# Patient Record
Sex: FEMALE | Race: WHITE | Employment: FULL TIME | URBAN - METROPOLITAN AREA
[De-identification: names, ages, dates, MRNs, and addresses within clinical notes are randomized per-mention and may not be internally consistent; named-entity substitution may affect disease eponyms.]

---
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
## 2021-05-21 ENCOUNTER — OFFICE VISIT (OUTPATIENT)
Dept: FAMILY MEDICINE CLINIC | Age: 21
End: 2021-05-21
Payer: MEDICAID

## 2021-05-21 VITALS
HEIGHT: 65 IN | WEIGHT: 235 LBS | TEMPERATURE: 98.1 F | DIASTOLIC BLOOD PRESSURE: 85 MMHG | HEART RATE: 74 BPM | BODY MASS INDEX: 39.15 KG/M2 | RESPIRATION RATE: 16 BRPM | SYSTOLIC BLOOD PRESSURE: 118 MMHG | OXYGEN SATURATION: 98 %

## 2021-05-21 DIAGNOSIS — Z00.00 ROUTINE GENERAL MEDICAL EXAMINATION AT A HEALTH CARE FACILITY: Primary | ICD-10-CM

## 2021-05-21 DIAGNOSIS — E55.9 VITAMIN D DEFICIENCY: ICD-10-CM

## 2021-05-21 DIAGNOSIS — E66.09 CLASS 2 OBESITY DUE TO EXCESS CALORIES WITHOUT SERIOUS COMORBIDITY WITH BODY MASS INDEX (BMI) OF 39.0 TO 39.9 IN ADULT: ICD-10-CM

## 2021-05-21 DIAGNOSIS — N94.6 DYSMENORRHEA: ICD-10-CM

## 2021-05-21 PROCEDURE — 99395 PREV VISIT EST AGE 18-39: CPT | Performed by: NURSE PRACTITIONER

## 2021-05-21 RX ORDER — NORETHINDRONE ACETATE AND ETHINYL ESTRADIOL 1MG-20(21)
KIT ORAL
Qty: 1 PACKAGE | Refills: 3 | Status: SHIPPED | OUTPATIENT
Start: 2021-05-21 | End: 2021-07-08

## 2021-05-21 NOTE — PATIENT INSTRUCTIONS
Learning About Being Physically Active What is physical activity? Being physically active means doing any kind of activity that gets your body moving. The types of physical activity that can help you get fit and stay healthy include: · Aerobic or \"cardio\" activities. These make your heart beat faster and make you breathe harder, such as brisk walking, riding a bike, or running. They strengthen your heart and lungs and build up your endurance. · Strength training activities. These make your muscles work against, or \"resist,\" something. Examples include lifting weights or doing push-ups. These activities help tone and strengthen your muscles and bones. · Stretches. These let you move your joints and muscles through their full range of motion. Stretching helps you be more flexible. What are the benefits of being active? Being active is one of the best things you can do for your health. It helps you to: · Feel stronger and have more energy to do all the things you like to do. · Focus better at school or work. · Feel, think, and sleep better. · Reach and stay at a healthy weight. · Lose fat and build lean muscle. · Lower your risk for serious health problems, including diabetes, heart attack, high blood pressure, and some cancers. · Keep your heart, lungs, bones, muscles, and joints strong and healthy. How can you make being active part of your life? Start slowly. Make it your long-term goal to get at least 30 minutes of exercise on most days of the week. Walking is a good choice. You also may want to do other activities, such as running, swimming, cycling, or playing tennis or team sports. Pick activities that you likeones that make your heart beat faster, your muscles stronger, and your muscles and joints more flexible. If you find more than one thing you like doing, do them all. You don't have to do the same thing every day. Get your heart pumping every day.  Any activity that makes your heart beat faster and keeps it at that rate for a while counts. Here are some great ways to get your heart beating faster: · Go for a brisk walk, run, or bike ride. · Go for a hike or swim. · Go in-line skating. · Play a game of touch football, basketball, or soccer. · Ride a bike. · Play tennis or racquetball. · Climb stairs. Even some household chores can be aerobicjust do them at a faster pace. Vacuuming, raking or mowing the lawn, sweeping the garage, and washing and waxing the car all can help get your heart rate up. Strengthen your muscles during the week. You don't have to lift heavy weights or grow big, bulky muscles to get stronger. Doing a few simple activities that make your muscles work against, or \"resist,\" something can help you get stronger. For example, you can: · Do push-ups or sit-ups, which use your own body weight as resistance. · Lift weights or dumbbells or use stretch bands at home or in a gym or community center. Stretch your muscles often. Stretching will help you as you become more active. It can help you stay flexible, loosen tight muscles, and avoid injury. It can also help improve your balance and posture and can be a great way to relax. Be sure to stretch the muscles you'll be using when you work out. It's best to warm your muscles slightly before you stretch them. Walk or do some other light aerobic activity for a few minutes, and then start stretching. When you stretch your muscles: · Do it slowly. Stretching is not about going fast or making sudden movements. · Don't push or bounce during a stretch. · Hold each stretch for at least 15 to 30 seconds, if you can. You should feel a stretch in the muscle, but not pain. · Breathe out as you do the stretch. Then breathe in as you hold the stretch. Don't hold your breath. If you're worried about how more activity might affect your health, have a checkup before you start.  Follow any special advice your doctor gives you for getting a smart start. Where can you learn more? Go to http://www.gray.com/ Enter S201 in the search box to learn more about \"Learning About Being Physically Active. \" Current as of: September 10, 2020               Content Version: 12.8 © 2006-2021 [employer]. Care instructions adapted under license by [employer] (which disclaims liability or warranty for this information). If you have questions about a medical condition or this instruction, always ask your healthcare professional. [place] [address] any warranty or liability for your use of this information. Eating Healthy Foods: Care Instructions Your Care Instructions Eating healthy foods can help lower your risk for disease. Healthy food gives you energy and keeps your heart strong, your brain active, your muscles working, and your bones strong. A healthy diet includes a variety of foods from the basic food groups: grains, vegetables, fruits, milk and milk products, and meat and beans. Some people may eat more of their favorite foods from only one food group and, as a result, miss getting the nutrients they need. So, it is important to pay attention not only to what you eat but also to what you are missing from your diet. You can eat a healthy, balanced diet by making a few small changes. Follow-up care is a key part of your treatment and safety. Be sure to make and go to all appointments, and call your doctor if you are having problems. It's also a good idea to know your test results and keep a list of the medicines you take. How can you care for yourself at home? Look at what you eat · Keep a food diary for a week or two and record everything you eat or drink. Track the number of servings you eat from each food group.  
· For a balanced diet every day, eat a variety of: 
? 6 or more ounce-equivalents of grains, such as cereals, breads, crackers, rice, or pasta, every day. An ounce-equivalent is 1 slice of bread, 1 cup of ready-to-eat cereal, or ½ cup of cooked rice, cooked pasta, or cooked cereal. 
? 2½ cups of vegetables, especially: § Dark-green vegetables such as broccoli and spinach. § Orange vegetables such as carrots and sweet potatoes. § Dry beans (such as downs and kidney beans) and peas (such as lentils). ? 2 cups of fresh, frozen, or canned fruit. A small apple or 1 banana or orange equals 1 cup. ? 3 cups of nonfat or low-fat milk, yogurt, or other milk products. ? 5½ ounces of meat and beans, such as chicken, fish, lean meat, beans, nuts, and seeds. One egg, 1 tablespoon of peanut butter, ½ ounce nuts or seeds, or ¼ cup of cooked beans equals 1 ounce of meat. · Learn how to read food labels for serving sizes and ingredients. Fast-food and convenience-food meals often contain few or no fruits or vegetables. Make sure you eat some fruits and vegetables to make the meal more nutritious. · Look at your food diary. For each food group, add up what you have eaten and then divide the total by the number of days. This will give you an idea of how much you are eating from each food group. See if you can find some ways to change your diet to make it more healthy. Start small · Do not try to make dramatic changes to your diet all at once. You might feel that you are missing out on your favorite foods and then be more likely to fail. · Start slowly, and gradually change your habits. Try some of the following: ? Use whole wheat bread instead of white bread. ? Use nonfat or low-fat milk instead of whole milk. ? Eat brown rice instead of white rice, and eat whole wheat pasta instead of white-flour pasta. ? Try low-fat cheeses and low-fat yogurt. ? Add more fruits and vegetables to meals and have them for snacks. ? Add lettuce, tomato, cucumber, and onion to sandwiches. ? Add fruit to yogurt and cereal. 
Enjoy food · You can still eat your favorite foods.  You just may need to eat less of them. If your favorite foods are high in fat, salt, and sugar, limit how often you eat them, but do not cut them out entirely. · Eat a wide variety of foods. Make healthy choices when eating out · The type of restaurant you choose can help you make healthy choices. Even fast-food chains are now offering more low-fat or healthier choices on the menu. · Choose smaller portions, or take half of your meal home. · When eating out, try: ? A veggie pizza with a whole wheat crust or grilled chicken (instead of sausage or pepperoni). ? Pasta with roasted vegetables, grilled chicken, or marinara sauce instead of cream sauce. ? A vegetable wrap or grilled chicken wrap. ? Broiled or poached food instead of fried or breaded items. Make healthy choices easy · Buy packaged, prewashed, ready-to-eat fresh vegetables and fruits, such as baby carrots, salad mixes, and chopped or shredded broccoli and cauliflower. · Buy packaged, presliced fruits, such as melon or pineapple. · Choose 100% fruit or vegetable juice instead of soda. Limit juice intake to 4 to 6 oz (½ to ¾ cup) a day. · Blend low-fat yogurt, fruit juice, and canned or frozen fruit to make a smoothie for breakfast or a snack. Where can you learn more? Go to http://www.alexandre.com/ Enter T756 in the search box to learn more about \"Eating Healthy Foods: Care Instructions. \" Current as of: December 17, 2020               Content Version: 12.8 © 3311-7257 [employer]. Care instructions adapted under license by [employer] (which disclaims liability or warranty for this information). If you have questions about a medical condition or this instruction, always ask your healthcare professional. [place] [address] any warranty or liability for your use of this information.

## 2021-05-21 NOTE — PROGRESS NOTES
Ronal Tavarez is a 19 y.o. female (: 2000) presenting to address:    Chief Complaint   Patient presents with   Gregoria Abarca Establish Care    Back Pain     moves from upper to lower.  Breast pain     under left breast     Other     feel pain from things that shouldn't hurt .  Other     birth control     Ear Fullness     told by her fiance that she cant hear well .  Immunization/Injection     HPV vaccine        Vitals:    21 1111   BP: 118/85   Pulse: 74   Resp: 16   Temp: 98.1 °F (36.7 °C)   TempSrc: Temporal   SpO2: 98%   Weight: 235 lb (106.6 kg)   Height: 5' 5\" (1.651 m)   PainSc:   0 - No pain   LMP: 05/10/2021       Hearing/Vision:   No exam data present    Learning Assessment:   No flowsheet data found. Depression Screening:   No flowsheet data found. Fall Risk Assessment:     Fall Risk Assessment, last 12 mths 2021   Able to walk? Yes   Fall in past 12 months? 0   Do you feel unsteady? 0   Are you worried about falling 0     Abuse Screening:     Abuse Screening Questionnaire 2021   Do you ever feel afraid of your partner? N   Are you in a relationship with someone who physically or mentally threatens you? N   Is it safe for you to go home? Y     Coordination of Care Questionaire:   1. Have you been to the ER, urgent care clinic since your last visit? Hospitalized since your last visit? NO    2. Have you seen or consulted any other health care providers outside of the [address] [place] [address] since your last visit? Include any pap smears or colon screening. NO    Advanced Directive:   1. Do you have an Advanced Directive? NO    2. Would you like information on Advanced Directives?  NO

## 2021-05-21 NOTE — PROGRESS NOTES
HPI  19 y.o. female is presenting for annual exam and complete physical.  Her gynecologic and breast care are done elsewhere by her OB/GYN physician. Most recent Pap smear: unsure  Prior Pap result: Normal.  Prior cervical/vaginal disease: Normal exam without visible pathology. Prior cervical treatment: no treatment. LMP/regularity: 5/10/2021, regular    Specific concerns today: wants to know about HPV vaccine - unsure if she has had it but her fiance told her to ask about it; wants to go back on OCP for cramps; interested in diet pills - not exercising and has a poor diet        Past Medical History:   Diagnosis Date    Anxiety     Chlamydia     Major depressive disorder with single episode     Migraine        Past Surgical History:   Procedure Laterality Date    HX GI      laparoscopic cholecystectomy 15       Family History   Problem Relation Age of Onset    No Known Problems Mother     Diabetes Father     Hypertension Father     Heart Disease Father     Arthritis-rheumatoid Father     No Known Problems Brother     Heart Disease Brother        Social History     Tobacco Use    Smoking status: Former Smoker     Packs/day: 0.50     Quit date: 2016     Years since quittin.4    Smokeless tobacco: Never Used   Vaping Use    Vaping Use: Never assessed   Substance Use Topics    Alcohol use: No    Drug use: No       Current Outpatient Medications on File Prior to Visit   Medication Sig Dispense Refill    ibuprofen (MOTRIN) 800 mg tablet Take 1 Tab by mouth three (3) times daily. Indications: Pain 30 Tab 1    butalbital-acetaminophen-caff (FIORICET) -40 mg per capsule Take 1 Cap by mouth every four (4) hours as needed for Pain. (Patient not taking: Reported on 2021) 12 Cap 0    ondansetron (ZOFRAN ODT) 4 mg disintegrating tablet Take 1 Tab by mouth every eight (8) hours as needed for Nausea.  (Patient not taking: Reported on 2021) 10 Tab 0    oxyCODONE-acetaminophen (PERCOCET) 5-325 mg per tablet Take 1 Tab by mouth every four (4) hours as needed. Max Daily Amount: 6 Tabs. Indications: Pain (Patient not taking: Reported on 5/21/2021) 30 Tab 0     No current facility-administered medications on file prior to visit. No Known Allergies    Review of Systems   Constitutional: Negative for chills, fever, malaise/fatigue and weight loss. HENT: Negative for congestion, ear pain, hearing loss, sinus pain, sore throat and tinnitus. Eyes: Negative for blurred vision, double vision, photophobia and pain. Respiratory: Negative for cough and shortness of breath. Cardiovascular: Negative for chest pain, palpitations and leg swelling. Gastrointestinal: Negative for abdominal pain, constipation, diarrhea, heartburn, nausea and vomiting. Genitourinary: Negative for dysuria, frequency and urgency. Musculoskeletal: Negative for back pain, joint pain and myalgias. Skin: Negative for rash. Neurological: Negative for dizziness and headaches. Psychiatric/Behavioral: Negative for depression and suicidal ideas. The patient is not nervous/anxious. PE  /85   Pulse 74   Temp 98.1 °F (36.7 °C) (Temporal)   Resp 16   Ht 5' 5\" (1.651 m)   Wt 235 lb (106.6 kg)   LMP 05/10/2021   SpO2 98%   BMI 39.11 kg/m²     Physical Exam  Vitals reviewed. Constitutional:       General: She is not in acute distress. Appearance: Normal appearance. She is obese. HENT:      Head: Normocephalic and atraumatic. Right Ear: Tympanic membrane, ear canal and external ear normal.      Left Ear: Tympanic membrane, ear canal and external ear normal.      Nose: Nose normal. No nasal deformity, congestion or rhinorrhea. Mouth/Throat:      Lips: Pink. No lesions. Mouth: Mucous membranes are moist. No oral lesions. Dentition: Normal dentition. Tongue: No lesions. Pharynx: Oropharynx is clear.    Eyes:      General: Lids are normal.      Extraocular Movements: Extraocular movements intact. Conjunctiva/sclera: Conjunctivae normal.      Pupils: Pupils are equal, round, and reactive to light. Neck:      Thyroid: No thyroid mass, thyromegaly or thyroid tenderness. Cardiovascular:      Rate and Rhythm: Normal rate and regular rhythm. Pulses:           Dorsalis pedis pulses are 2+ on the right side and 2+ on the left side. Heart sounds: S1 normal and S2 normal. No murmur heard. No friction rub. No gallop. No S3 or S4 sounds. Pulmonary:      Effort: Pulmonary effort is normal.      Breath sounds: Normal breath sounds. No wheezing, rhonchi or rales. Abdominal:      General: Abdomen is flat. Bowel sounds are normal. There is no distension. Palpations: Abdomen is soft. There is no hepatomegaly, splenomegaly or mass. Tenderness: There is no abdominal tenderness. There is no guarding or rebound. Musculoskeletal:      Cervical back: Full passive range of motion without pain. Right lower leg: No edema. Left lower leg: No edema. Lymphadenopathy:      Head:      Right side of head: No submental, submandibular, tonsillar, preauricular, posterior auricular or occipital adenopathy. Left side of head: No submental, submandibular, tonsillar, preauricular, posterior auricular or occipital adenopathy. Cervical: No cervical adenopathy. Upper Body:      Right upper body: No supraclavicular adenopathy. Left upper body: No supraclavicular adenopathy. Skin:     General: Skin is warm and dry. Capillary Refill: Capillary refill takes less than 2 seconds. Neurological:      General: No focal deficit present. Mental Status: She is alert and oriented to person, place, and time. Cranial Nerves: Cranial nerves are intact. Sensory: Sensation is intact. Motor: Motor function is intact. Coordination: Coordination is intact. Gait: Gait is intact.       Deep Tendon Reflexes:      Reflex Scores:       Patellar reflexes are 2+ on the right side and 2+ on the left side. Psychiatric:         Attention and Perception: Attention and perception normal.         Mood and Affect: Mood and affect normal.         Speech: Speech normal.         Behavior: Behavior normal.         Thought Content: Thought content normal.         Cognition and Memory: Cognition and memory normal.         Judgment: Judgment normal.           Assessment/Plan  1. Preventive care  Fasting labs  Advised to get immunization records from old PCP and to also ask GYN about HPV vaccine    2. Obesity  No medications for weight loss at this time since no attempts have been made with regards to diet and exercise  Advised to start an aerobic exercise program, goal of 150 minutes/week of physical activity  General diet and nutrition information given    3.  Dysmenorrhea  Restart OCP, advised to use back up method for the first 7 days

## 2021-05-24 ENCOUNTER — APPOINTMENT (OUTPATIENT)
Dept: FAMILY MEDICINE CLINIC | Age: 21
End: 2021-05-24

## 2021-05-24 ENCOUNTER — HOSPITAL ENCOUNTER (OUTPATIENT)
Dept: LAB | Age: 21
Discharge: HOME | End: 2021-05-24
Payer: MEDICAID

## 2021-05-24 DIAGNOSIS — E55.9 VITAMIN D DEFICIENCY: ICD-10-CM

## 2021-05-24 DIAGNOSIS — Z00.00 ROUTINE GENERAL MEDICAL EXAMINATION AT A HEALTH CARE FACILITY: ICD-10-CM

## 2021-05-24 LAB
25(OH)D3 SERPL-MCNC: 19 NG/ML (ref 30–100)
ALBUMIN SERPL-MCNC: 4 G/DL (ref 3.4–5)
ALBUMIN/GLOB SERPL: 1.1 {RATIO} (ref 0.8–1.7)
ALP SERPL-CCNC: 100 U/L (ref 45–117)
ALT SERPL-CCNC: 28 U/L (ref 13–56)
ANION GAP SERPL CALC-SCNC: 5 MMOL/L (ref 3–18)
APPEARANCE UR: ABNORMAL
AST SERPL-CCNC: 13 U/L (ref 10–38)
BACTERIA URNS QL MICRO: ABNORMAL /HPF
BASOPHILS # BLD: 0 K/UL (ref 0–0.1)
BASOPHILS NFR BLD: 0 % (ref 0–2)
BILIRUB SERPL-MCNC: 1.2 MG/DL (ref 0.2–1)
BILIRUB UR QL: NEGATIVE
BUN SERPL-MCNC: 11 MG/DL (ref 7–18)
BUN/CREAT SERPL: 16 (ref 12–20)
CALCIUM SERPL-MCNC: 8.7 MG/DL (ref 8.5–10.1)
CHLORIDE SERPL-SCNC: 106 MMOL/L (ref 100–111)
CO2 SERPL-SCNC: 27 MMOL/L (ref 21–32)
COLOR UR: YELLOW
CREAT SERPL-MCNC: 0.7 MG/DL (ref 0.6–1.3)
DIFFERENTIAL METHOD BLD: NORMAL
EOSINOPHIL # BLD: 0.1 K/UL (ref 0–0.4)
EOSINOPHIL NFR BLD: 1 % (ref 0–5)
EPITH CASTS URNS QL MICRO: ABNORMAL /LPF (ref 0–5)
ERYTHROCYTE [DISTWIDTH] IN BLOOD BY AUTOMATED COUNT: 12.9 % (ref 11.6–14.5)
GLOBULIN SER CALC-MCNC: 3.8 G/DL (ref 2–4)
GLUCOSE SERPL-MCNC: 78 MG/DL (ref 74–99)
GLUCOSE UR STRIP.AUTO-MCNC: NEGATIVE MG/DL
HCT VFR BLD AUTO: 40 % (ref 35–45)
HGB BLD-MCNC: 13 G/DL (ref 12–16)
HGB UR QL STRIP: NEGATIVE
KETONES UR QL STRIP.AUTO: NEGATIVE MG/DL
LEUKOCYTE ESTERASE UR QL STRIP.AUTO: ABNORMAL
LYMPHOCYTES # BLD: 2.3 K/UL (ref 0.9–3.6)
LYMPHOCYTES NFR BLD: 32 % (ref 21–52)
MCH RBC QN AUTO: 28.5 PG (ref 24–34)
MCHC RBC AUTO-ENTMCNC: 32.5 G/DL (ref 31–37)
MCV RBC AUTO: 87.7 FL (ref 74–97)
MONOCYTES # BLD: 0.4 K/UL (ref 0.05–1.2)
MONOCYTES NFR BLD: 6 % (ref 3–10)
NEUTS SEG # BLD: 4.4 K/UL (ref 1.8–8)
NEUTS SEG NFR BLD: 61 % (ref 40–73)
NITRITE UR QL STRIP.AUTO: NEGATIVE
PH UR STRIP: 5.5 [PH] (ref 5–8)
PLATELET # BLD AUTO: 382 K/UL (ref 135–420)
PMV BLD AUTO: 10.9 FL (ref 9.2–11.8)
POTASSIUM SERPL-SCNC: 4.5 MMOL/L (ref 3.5–5.5)
PROT SERPL-MCNC: 7.8 G/DL (ref 6.4–8.2)
PROT UR STRIP-MCNC: NEGATIVE MG/DL
RBC # BLD AUTO: 4.56 M/UL (ref 4.2–5.3)
RBC #/AREA URNS HPF: NEGATIVE /HPF (ref 0–5)
SODIUM SERPL-SCNC: 138 MMOL/L (ref 136–145)
SP GR UR REFRACTOMETRY: 1.02 (ref 1–1.03)
TSH SERPL DL<=0.05 MIU/L-ACNC: 0.47 UIU/ML (ref 0.36–3.74)
UROBILINOGEN UR QL STRIP.AUTO: 0.2 EU/DL (ref 0.2–1)
WBC # BLD AUTO: 7.3 K/UL (ref 4.6–13.2)
WBC URNS QL MICRO: ABNORMAL /HPF (ref 0–4)

## 2021-05-24 PROCEDURE — 85025 COMPLETE CBC W/AUTO DIFF WBC: CPT

## 2021-05-24 PROCEDURE — 82306 VITAMIN D 25 HYDROXY: CPT

## 2021-05-24 PROCEDURE — 84443 ASSAY THYROID STIM HORMONE: CPT

## 2021-05-24 PROCEDURE — 36415 COLL VENOUS BLD VENIPUNCTURE: CPT

## 2021-05-24 PROCEDURE — 80053 COMPREHEN METABOLIC PANEL: CPT

## 2021-05-24 PROCEDURE — 81001 URINALYSIS AUTO W/SCOPE: CPT

## 2021-08-17 ENCOUNTER — OFFICE VISIT (OUTPATIENT)
Dept: FAMILY MEDICINE CLINIC | Age: 21
End: 2021-08-17
Payer: MEDICAID

## 2021-08-17 VITALS
WEIGHT: 237 LBS | RESPIRATION RATE: 16 BRPM | BODY MASS INDEX: 39.49 KG/M2 | HEIGHT: 65 IN | TEMPERATURE: 98.3 F | HEART RATE: 77 BPM | DIASTOLIC BLOOD PRESSURE: 85 MMHG | SYSTOLIC BLOOD PRESSURE: 127 MMHG | OXYGEN SATURATION: 98 %

## 2021-08-17 DIAGNOSIS — Z12.4 SCREENING FOR CERVICAL CANCER: ICD-10-CM

## 2021-08-17 DIAGNOSIS — M54.42 CHRONIC BILATERAL LOW BACK PAIN WITH LEFT-SIDED SCIATICA: Primary | ICD-10-CM

## 2021-08-17 DIAGNOSIS — G89.29 CHRONIC BILATERAL LOW BACK PAIN WITH LEFT-SIDED SCIATICA: Primary | ICD-10-CM

## 2021-08-17 PROCEDURE — 99213 OFFICE O/P EST LOW 20 MIN: CPT | Performed by: NURSE PRACTITIONER

## 2021-08-17 RX ORDER — CHOLECALCIFEROL (VITAMIN D3) 125 MCG
CAPSULE ORAL
COMMUNITY

## 2021-08-17 NOTE — PROGRESS NOTES
James Barajas is a 19 y.o. female (: 2000) presenting to address:    Chief Complaint   Patient presents with    Back Pain      left buttock goes down into leg . has gotten so back sometime the leg will collapse .  Leg Pain       Vitals:    21 1539   BP: 127/85   Pulse: 77   Resp: 16   Temp: 98.3 °F (36.8 °C)   TempSrc: Temporal   SpO2: 98%   Weight: 237 lb (107.5 kg)   Height: 5' 5\" (1.651 m)   PainSc:   2   PainLoc: Buttocks   LMP: 2021       Hearing/Vision:   No exam data present    Learning Assessment:   No flowsheet data found. Depression Screening:     3 most recent PHQ Screens 2021   Little interest or pleasure in doing things Not at all   Feeling down, depressed, irritable, or hopeless Not at all   Total Score PHQ 2 0   Trouble falling or staying asleep, or sleeping too much -   Feeling tired or having little energy -   Poor appetite, weight loss, or overeating -   Feeling bad about yourself - or that you are a failure or have let yourself or your family down -   Trouble concentrating on things such as school, work, reading, or watching TV -   Moving or speaking so slowly that other people could have noticed; or the opposite being so fidgety that others notice -   Thoughts of being better off dead, or hurting yourself in some way -   PHQ 9 Score -   How difficult have these problems made it for you to do your work, take care of your home and get along with others -     Fall Risk Assessment:     Fall Risk Assessment, last 12 mths 2021   Able to walk? Yes   Fall in past 12 months? 0   Do you feel unsteady? 0   Are you worried about falling 0     Abuse Screening:     Abuse Screening Questionnaire 2021   Do you ever feel afraid of your partner? N   Are you in a relationship with someone who physically or mentally threatens you? N   Is it safe for you to go home? Y     Coordination of Care Questionaire:   1.  Have you been to the ER, urgent care clinic since your last visit? Hospitalized since your last visit? NO    2. Have you seen or consulted any other health care providers outside of the [address] [place] [address] since your last visit? Include any pap smears or colon screening. NO    Advanced Directive:   1. Do you have an Advanced Directive? NO    2. Would you like information on Advanced Directives?  NO

## 2021-08-17 NOTE — LETTER
NOTIFICATION RETURN TO WORK / SCHOOL    8/17/2021 3:53 PM    Ms. Yosef Bialey Heydi  [address] [id number]      To Whom It May Concern:    Kar Stuart is currently under the care of [address] [place] [address]. She will require light duty restrictions at work, no lifting over 15lbs, for the next 2 weeks. If there are questions or concerns please have the patient contact our office.         Sincerely,      Aleah Arriaza NP

## 2021-08-17 NOTE — PROGRESS NOTES
BRIGHT Toledo is a 19y.o. year old female who presents today with low back pain on the left for the last 4-6 months that has been getting worse over time. It starts in the left lower back above the buttock and radiates down the leg. Sometimes feels like her leg is going to give out. Pain is dull and achy, then sharp and shooting with certain movements. Has taken OTC NSAIDs with minimal relief. Past Medical History:   Diagnosis Date    Anxiety     Chlamydia     Major depressive disorder with single episode     Migraine        Past Surgical History:   Procedure Laterality Date    HX GI      laparoscopic cholecystectomy 15       Social History     Tobacco Use    Smoking status: Former Smoker     Packs/day: 0.50     Quit date: 2016     Years since quittin.7    Smokeless tobacco: Never Used   Vaping Use    Vaping Use: Every day    Substances: Nicotine    Devices: Disposable   Substance Use Topics    Alcohol use: No    Drug use: No         Current Outpatient Medications:     naproxen sodium (Aleve) 220 mg cap, Take  by mouth daily as needed. , Disp: , Rfl:     norethindrone-ethinyl estradiol (Junel FE 1/20, ,) 1 mg-20 mcg (21)/75 mg () tab, TAKE 1 TABLET BY MOUTH ONCE DAILY FOR PAIN WITH MENSTRUATION, Disp: 28 Tablet, Rfl: 3    ibuprofen (MOTRIN) 800 mg tablet, Take 1 Tab by mouth three (3) times daily. Indications: Pain, Disp: 30 Tab, Rfl: 1    butalbital-acetaminophen-caff (FIORICET) -40 mg per capsule, Take 1 Cap by mouth every four (4) hours as needed for Pain. (Patient not taking: Reported on 2021), Disp: 12 Cap, Rfl: 0    ondansetron (ZOFRAN ODT) 4 mg disintegrating tablet, Take 1 Tab by mouth every eight (8) hours as needed for Nausea. (Patient not taking: Reported on 2021), Disp: 10 Tab, Rfl: 0    oxyCODONE-acetaminophen (PERCOCET) 5-325 mg per tablet, Take 1 Tab by mouth every four (4) hours as needed. Max Daily Amount: 6 Tabs.  Indications: Pain (Patient not taking: Reported on 5/21/2021), Disp: 30 Tab, Rfl: 0     No Known Allergies     Review of Systems   Musculoskeletal: Positive for back pain (left lower). All other systems reviewed and are negative. PE  /85   Pulse 77   Temp 98.3 °F (36.8 °C) (Temporal)   Resp 16   Ht 5' 5\" (1.651 m)   Wt 237 lb (107.5 kg)   LMP 08/17/2021   SpO2 98%   BMI 39.44 kg/m²     Physical Exam  Vitals reviewed. Constitutional:       General: She is not in acute distress. Appearance: Normal appearance. HENT:      Head: Normocephalic and atraumatic. Musculoskeletal:      Lumbar back: No swelling, spasms, tenderness or bony tenderness. Normal range of motion. Positive left straight leg raise test. Negative right straight leg raise test.   Skin:     General: Skin is warm and dry. Neurological:      Mental Status: She is alert and oriented to person, place, and time. Sensory: Sensation is intact. Motor: Motor function is intact. Deep Tendon Reflexes:      Reflex Scores:       Patellar reflexes are 2+ on the right side and 2+ on the left side. Assessment/Plan  1.  LBP, sciatica  Prednisone 60 X2 days, 50 X2 days, 40 X2 days, 30 X2 days, 20 X2 days, 10 X2 days  Xray lumbar spine  Stretching at home daily, handout given  FU pending xray

## 2021-08-17 NOTE — PATIENT INSTRUCTIONS
Sciatica: Exercises  Introduction  Here are some examples of typical rehabilitation exercises for your condition. Start each exercise slowly. Ease off the exercise if you start to have pain. Your doctor or physical therapist will tell you when you can start these exercises and which ones will work best for you. When you are not being active, find a comfortable position for rest. Some people are comfortable on the floor or a medium-firm bed with a small pillow under their head and another under their knees. Some people prefer to lie on their side with a pillow between their knees. Don't stay in one position for too long. Take short walks (10 to 20 minutes) every 2 to 3 hours. Avoid slopes, hills, and stairs until you feel better. Walk only distances you can manage without pain, especially leg pain. How to do the exercises  Back stretches   1. Get down on your hands and knees on the floor. 2. Relax your head and allow it to droop. Round your back up toward the ceiling until you feel a nice stretch in your upper, middle, and lower back. Hold this stretch for as long as it feels comfortable, or about 15 to 30 seconds. 3. Return to the starting position with a flat back while you are on your hands and knees. 4. Let your back sway by pressing your stomach toward the floor. Lift your buttocks toward the ceiling. 5. Hold this position for 15 to 30 seconds. 6. Repeat 2 to 4 times. Follow-up care is a key part of your treatment and safety. Be sure to make and go to all appointments, and call your doctor if you are having problems. It's also a good idea to know your test results and keep a list of the medicines you take. Where can you learn more? Go to http://www.gray.com/  Enter W486 in the search box to learn more about \"Sciatica: Exercises. \"  Current as of: November 16, 2020               Content Version: 12.8  © 8257-5386 [employer].    Care instructions adapted under license by [employer] (which disclaims liability or warranty for this information). If you have questions about a medical condition or this instruction, always ask your healthcare professional. [address] any warranty or liability for your use of this information. Low Back Pain: Exercises  Introduction  Here are some examples of exercises for you to try. The exercises may be suggested for a condition or for rehabilitation. Start each exercise slowly. Ease off the exercises if you start to have pain. You will be told when to start these exercises and which ones will work best for you. How to do the exercises  Press-up   1. Lie on your stomach, supporting your body with your forearms. 2. Press your elbows down into the floor to raise your upper back. As you do this, relax your stomach muscles and allow your back to arch without using your back muscles. As your press up, do not let your hips or pelvis come off the floor. 3. Hold for 15 to 30 seconds, then relax. 4. Repeat 2 to 4 times. Alternate arm and leg (bird dog) exercise   Do this exercise slowly. Try to keep your body straight at all times, and do not let one hip drop lower than the other. 1. Start on the floor, on your hands and knees. 2. Tighten your belly muscles. 3. Raise one leg off the floor, and hold it straight out behind you. Be careful not to let your hip drop down, because that will twist your trunk. 4. Hold for about 6 seconds, then lower your leg and switch to the other leg. 5. Repeat 8 to 12 times on each leg. 6. Over time, work up to holding for 10 to 30 seconds each time. 7. If you feel stable and secure with your leg raised, try raising the opposite arm straight out in front of you at the same time. Knee-to-chest exercise   1. Lie on your back with your knees bent and your feet flat on the floor.   2. Bring one knee to your chest, keeping the other foot flat on the floor (or keeping the other leg straight, whichever feels better on your lower back). 3. Keep your lower back pressed to the floor. Hold for at least 15 to 30 seconds. 4. Relax, and lower the knee to the starting position. 5. Repeat with the other leg. Repeat 2 to 4 times with each leg. 6. To get more stretch, put your other leg flat on the floor while pulling your knee to your chest.    Curl-ups   1. Lie on the floor on your back with your knees bent at a 90-degree angle. Your feet should be flat on the floor, about 12 inches from your buttocks. 2. Cross your arms over your chest. If this bothers your neck, try putting your hands behind your neck (not your head), with your elbows spread apart. 3. Slowly tighten your belly muscles and raise your shoulder blades off the floor. 4. Keep your head in line with your body, and do not press your chin to your chest.  5. Hold this position for 1 or 2 seconds, then slowly lower yourself back down to the floor. 6. Repeat 8 to 12 times. Pelvic tilt exercise   1. Lie on your back with your knees bent. 2. \"Brace\" your stomach. This means to tighten your muscles by pulling in and imagining your belly button moving toward your spine. You should feel like your back is pressing to the floor and your hips and pelvis are rocking back. 3. Hold for about 6 seconds while you breathe smoothly. 4. Repeat 8 to 12 times. Heel dig bridging   1. Lie on your back with both knees bent and your ankles bent so that only your heels are digging into the floor. Your knees should be bent about 90 degrees. 2. Then push your heels into the floor, squeeze your buttocks, and lift your hips off the floor until your shoulders, hips, and knees are all in a straight line. 3. Hold for about 6 seconds as you continue to breathe normally, and then slowly lower your hips back down to the floor and rest for up to 10 seconds. 4. Do 8 to 12 repetitions. Hamstring stretch in doorway   1.  Lie on your back in a doorway, with one leg through the open door. 2. Slide your leg up the wall to straighten your knee. You should feel a gentle stretch down the back of your leg. 3. Hold the stretch for at least 15 to 30 seconds. Do not arch your back, point your toes, or bend either knee. Keep one heel touching the floor and the other heel touching the wall. 4. Repeat with your other leg. 5. Do 2 to 4 times for each leg. Hip flexor stretch   1. Kneel on the floor with one knee bent and one leg behind you. Place your forward knee over your foot. Keep your other knee touching the floor. 2. Slowly push your hips forward until you feel a stretch in the upper thigh of your rear leg. 3. Hold the stretch for at least 15 to 30 seconds. Repeat with your other leg. 4. Do 2 to 4 times on each side. Wall sit   1. Stand with your back 10 to 12 inches away from a wall. 2. Lean into the wall until your back is flat against it. 3. Slowly slide down until your knees are slightly bent, pressing your lower back into the wall. 4. Hold for about 6 seconds, then slide back up the wall. 5. Repeat 8 to 12 times. Follow-up care is a key part of your treatment and safety. Be sure to make and go to all appointments, and call your doctor if you are having problems. It's also a good idea to know your test results and keep a list of the medicines you take. Where can you learn more? Go to http://www.gray.com/  Enter R495 in the search box to learn more about \"Low Back Pain: Exercises. \"  Current as of: November 16, 2020               Content Version: 12.8  © 2667-3930 [email or web address]. Care instructions adapted under license by [employer] (which disclaims liability or warranty for this information).  If you have questions about a medical condition or this instruction, always ask your healthcare professional. [address] any warranty or liability for your use of this information.

## 2021-08-18 RX ORDER — PREDNISONE 10 MG/1
TABLET ORAL
Qty: 42 TABLET | Refills: 0 | Status: SHIPPED | OUTPATIENT
Start: 2021-08-18 | End: 2021-08-30

## 2021-08-20 DIAGNOSIS — G89.29 CHRONIC BILATERAL LOW BACK PAIN WITH LEFT-SIDED SCIATICA: Primary | ICD-10-CM

## 2021-08-20 DIAGNOSIS — M54.42 CHRONIC BILATERAL LOW BACK PAIN WITH LEFT-SIDED SCIATICA: Primary | ICD-10-CM

## 2021-08-23 DIAGNOSIS — G89.29 CHRONIC BILATERAL LOW BACK PAIN WITH BILATERAL SCIATICA: Primary | ICD-10-CM

## 2021-08-23 DIAGNOSIS — M54.41 CHRONIC BILATERAL LOW BACK PAIN WITH BILATERAL SCIATICA: Primary | ICD-10-CM

## 2021-08-23 DIAGNOSIS — M54.42 CHRONIC BILATERAL LOW BACK PAIN WITH BILATERAL SCIATICA: Primary | ICD-10-CM

## 2021-08-27 ENCOUNTER — TELEPHONE (OUTPATIENT)
Dept: FAMILY MEDICINE CLINIC | Age: 21
End: 2021-08-27

## 2021-08-27 DIAGNOSIS — M54.42 CHRONIC BILATERAL LOW BACK PAIN WITH LEFT-SIDED SCIATICA: Primary | ICD-10-CM

## 2021-08-27 DIAGNOSIS — G89.29 CHRONIC BILATERAL LOW BACK PAIN WITH LEFT-SIDED SCIATICA: Primary | ICD-10-CM

## 2021-08-27 NOTE — TELEPHONE ENCOUNTER
Jessica Duque form [facility] called to notify office patient's [facility] has denied her MRI of the lumbar spine . But said provider can do a peer to peer . Patient is currently scheduled on 8/31 for MRI and the deadline for peer to peer is 9/17 . case number is [id number] and phone mumber for [facility] is [phone] . Please advise if you would like to do a peer to peer or if patient should cancel her MRI .

## 2021-08-30 NOTE — TELEPHONE ENCOUNTER
Patient notified and will call and cancel MRI . Aware I would call her insurance to see what treatment is required and get back with her on the next step . I spoke with insurance patient should complete any conservative treatment like PT, Nsaid and or injections .

## 2021-08-30 NOTE — TELEPHONE ENCOUNTER
Since she has already tried NSAIDs, looks like she needs to try PT again before we can get MRI approval. Please let her know and if she is amenable I will place referral.

## 2021-08-30 NOTE — TELEPHONE ENCOUNTER
Reviewed letter from [facility]. What treatment is required before MRI can be approved? Have her cancel her MRI for now because it is not going to be covered and she will end up with a bill.

## 2021-09-22 NOTE — PROGRESS NOTES
PHYSICAL THERAPY - DAILY TREATMENT NOTE    Patient Name: Ashok Wallace        Date: 2021  : 2000   Yes Patient  Verified  Visit #:      12  Insurance: Payor: [address] / Plan: [address] [place] [id number] / Product Type: Managed Care Medicaid /      In time: 9:13 Out time: 9:42   Total Treatment Time: 29     Medicare/BCBS Time Tracking (below)   Total Timed Codes (min):  00 1:1 Treatment Time:  00     TREATMENT AREA =  Chronic bilateral low back pain with left-sided sciatica [M54.42, G89.29]    SUBJECTIVE  Pain Level (on 0 to 10 scale):  1-2   10   Medication Changes/New allergies or changes in medical history, any new surgeries or procedures?    no  If yes, update Summary List   Subjective Functional Status/Changes:  []  No changes reported     See POC         OBJECTIVE  Modalities Rationale:     NC min Therapeutic Exercise: Insurance covers initial evaluation only - thus  therex was unable to be initiated this visit. Plan to initiate therex and issue HEP NV. Rationale:      increase ROM and increase strength to improve the patients ability to perform ADLs.      Billed With/As:   [x] TE   [] TA   [] Neuro   [] Self Care Patient Education: [x] Review HEP    [x] Progressed/Changed HEP based on:   [x] positioning   [x] body mechanics   [] transfers   [] heat/ice application    [] other:      Other Objective/Functional Measures:    See POC     Post Treatment Pain Level (on 0 to 10) scale:   1-2  / 10     ASSESSMENT  Assessment/Changes in Function:     See POC     []  See Progress Note/Recertification   Patient will continue to benefit from skilled PT services to modify and progress therapeutic interventions, address functional mobility deficits, address ROM deficits, address strength deficits, analyze and address soft tissue restrictions, analyze and cue movement patterns, analyze and modify body mechanics/ergonomics, assess and modify postural abnormalities and instruct in home and community integration to attain remaining goals.    Progress toward goals / Updated goals:    See newly established goals in POC     PLAN  [x]  Upgrade activities as tolerated yes Continue plan of care   []  Discharge due to :    []  Other:      Therapist: Alexander Holm    Date: 9/23/2021 Time: 5:56 PM     Future Appointments   Date Time Provider Raúl Ariza   9/23/2021  9:00 AM Anisha Jacobsen

## 2021-09-23 ENCOUNTER — HOSPITAL ENCOUNTER (OUTPATIENT)
Dept: PHYSICAL THERAPY | Age: 21
Discharge: HOME | End: 2021-09-23
Attending: NURSE PRACTITIONER
Payer: MEDICAID

## 2021-09-23 PROCEDURE — 97162 PT EVAL MOD COMPLEX 30 MIN: CPT

## 2021-09-27 ENCOUNTER — HOSPITAL ENCOUNTER (OUTPATIENT)
Dept: PHYSICAL THERAPY | Age: 21
Discharge: HOME | End: 2021-09-27
Attending: NURSE PRACTITIONER
Payer: MEDICAID

## 2021-09-27 PROCEDURE — 97112 NEUROMUSCULAR REEDUCATION: CPT

## 2021-09-27 PROCEDURE — 97535 SELF CARE MNGMENT TRAINING: CPT

## 2021-09-27 PROCEDURE — 97110 THERAPEUTIC EXERCISES: CPT

## 2021-09-27 PROCEDURE — 97530 THERAPEUTIC ACTIVITIES: CPT

## 2021-09-27 NOTE — PROGRESS NOTES
PHYSICAL THERAPY - DAILY TREATMENT NOTE    Patient Name: Eliot Begin        Date: 2021  : 2000   yes Patient  Verified  Visit #:   2   of   12  Insurance: Payor: Laura Adjutant / Plan: 1 [place] [address] / Product Type: Managed Care Medicaid /      In time: 9:37 Out time: 10:24   Total Treatment Time: 47     Medicare/BCBS Time Tracking (below)   Total Timed Codes (min):  na 1:1 Treatment Time:  na     TREATMENT AREA =  Low back pain [M54.5]    SUBJECTIVE  Pain Level (on 0 to 10 scale):  0  / 10   Medication Changes/New allergies or changes in medical history, any new surgeries or procedures?    no  If yes, update Summary List   Subjective Functional Status/Changes:  []  No changes reported     Yesterday it was acting up - had to fill up the ice machine at work          OBJECTIVE  Modalities Rationale: na    11 min Therapeutic Exercise:  [x]  See flow sheet   Rationale:      increase ROM and increase strength to improve the patients ability to tolerate prolonged standing     8 NB min Manual Therapy: R ant innominate MET, L QL, piriformis, and psoas release   Rationale:      decrease pain, increase ROM, increase tissue extensibility and decrease trigger points to improve patient's ability to perform ADLs  The manual therapy interventions were performed at a separate and distinct time from the therapeutic activities interventions. 11 min Therapeutic Activity: Supine to sit transfer using log roll technique in order to decrease strain with functional mobility, patient education in lifting mechanics in order to decreased L/S pain with work duties    Rationale:    body positioning to improve the patients ability to transfer in bed and lift at work.     9 min Neuromuscular Re-ed: [x]  See flow sheet   Rationale:    improve coordination, improve balance, and increase proprioception to improve the patients ability to perform functional mobility safely    8 min Self Care: Tennis ball piriformis release and review of HEP for self management of sx   Rationale:    increase ROM, increase strength, and improve coordination to improve the patients ability to perfom ADLs    Billed With/As:   [x] TE   [] TA   [] Neuro   [] Self Care Patient Education: [x] Review HEP    [x] Progressed/Changed HEP based on:   [x] positioning   [x] body mechanics   [] transfers   [] heat/ice application    [] other:      Other Objective/Functional Measures:    Pt presented with increased TTP and mm tone in L piriformis and psoas  SI FF +R SI  Leg length - R anterior innominate rot  Patient education on log roll transfer and lifting mechanics in order to decrease strain on back with functional activities      Post Treatment Pain Level (on 0 to 10) scale:   1-2 / 10     ASSESSMENT  Assessment/Changes in Function:     Initiated therex per flow sheet - req'red [address] t/o as this is patient's first treatment visit. Updated and issued HEP. []  See Progress Note/Recertification   Patient will continue to benefit from skilled PT services to modify and progress therapeutic interventions, address functional mobility deficits, address ROM deficits, address strength deficits, analyze and address soft tissue restrictions, analyze and cue movement patterns, analyze and modify body mechanics/ergonomics, assess and modify postural abnormalities and instruct in home and community integration to attain remaining goals. Progress toward goals / Updated goals:    First visit after initial evaluation. Progress tx per POC.         PLAN  [x]  Upgrade activities as tolerated yes Continue plan of care   []  Discharge due to :    []  Other:      Therapist: Branden Mckinley    Date: 9/27/2021 Time: 9:14 AM     Future Appointments   Date Time Provider Raúl Ariza   9/27/2021  9:30 AM Tricia Galeana [facility]   10/1/2021  1:30 PM MART[address]   10/4/2021 11:45 AM MARTINE [address] 10/8/2021 10:15 AM Kelsi Berry, PTA [address]   10/11/2021 10:15 AM Kelsi Berry, MARTINE [facility]   10/15/2021 10:15 AM Kelsi Berry, MARTINE [facility]   10/18/2021 10:15 AM Kelsi Berry PTA [facility]   10/22/2021  9:30 AM [facility]   10/25/2021  9:30 AM [facility]   10/29/2021  9:30 AM Kelsi Berry, PTA [address]

## 2021-10-01 ENCOUNTER — HOSPITAL ENCOUNTER (OUTPATIENT)
Dept: PHYSICAL THERAPY | Age: 21
Discharge: HOME | End: 2021-10-01
Attending: NURSE PRACTITIONER
Payer: MEDICAID

## 2021-10-01 PROCEDURE — 97530 THERAPEUTIC ACTIVITIES: CPT

## 2021-10-01 PROCEDURE — 97535 SELF CARE MNGMENT TRAINING: CPT

## 2021-10-01 PROCEDURE — 97110 THERAPEUTIC EXERCISES: CPT

## 2021-10-01 NOTE — PROGRESS NOTES
PHYSICAL THERAPY - DAILY TREATMENT NOTE    Patient Name: Mike Baker        Date: 10/1/2021  : 2000   yes Patient  Verified  Visit #:   3   of   12  Insurance: Payor: Nichola Kawasaki / Plan: [address] [place] [address] / Product Type: Managed Care Medicaid /      In time: 135 Out time: 220   Total Treatment Time: 45     Medicare/BCBS Time Tracking (below)   Total Timed Codes (min):  45 1:1 Treatment Time:  45     TREATMENT AREA =  Low back pain [M54.50]    SUBJECTIVE  Pain Level (on 0 to 10 scale):  3   10   Medication Changes/New allergies or changes in medical history, any new surgeries or procedures?    no  If yes, update Summary List   Subjective Functional Status/Changes:  []  No changes reported     I was very active yesterday and it's been sore today. OBJECTIVE  15 min Therapeutic Exercise:  [x]  See flow sheet   Rationale:      increase ROM, increase strength, improve coordination and improve balance to improve the patients ability to perform pain free ADLs. 15 min Therapeutic Activity: [x]  See flow sheet   Rationale:    increase ROM, increase strength and improve coordination to improve the patients ability to perform pain free ADLs. 13 Min Self Care: HEP review. Rationale:    increase ROM, increase strength, improve coordination and improve balance to improve the patients ability to perform pain free ADLs. Billed With/As:   [x] TE   [] TA   [] Neuro   [] Self Care Patient Education: [x] Review HEP    [] Progressed/Changed HEP based on:   [] positioning   [] body mechanics   [] transfers   [] heat/ice application    [] other:      Other Objective/Functional Measures: Therex per flow sheet. Post Treatment Pain Level (on 0 to 10) scale:    10     ASSESSMENT  Assessment/Changes in Function:     No exacerbation of symptoms with today's session.       []  See Progress Note/Recertification   Patient will continue to benefit from skilled PT services to modify and progress therapeutic interventions, address functional mobility deficits, address ROM deficits, address strength deficits, analyze and address soft tissue restrictions, analyze and cue movement patterns, analyze and modify body mechanics/ergonomics and assess and modify postural abnormalities to attain remaining goals. Progress toward goals / Updated goals:    No change in progress toward LTG's with today's session.       PLAN  [x]  Upgrade activities as tolerated yes Continue plan of care   []  Discharge due to :    []  Other:      Therapist: Dante Rojas PTA    Date: 10/1/2021 Time: 1:41 PM     Future Appointments   Date Time Provider Raúl Ariza   10/4/2021 11:45 AM Frank Anderson PTA [facility]   10/8/2021 10:15 AM Frank Anderson PTA [facility]   10/11/2021 10:15 AM Frank Anderson PTA [facility]   10/15/2021 10:15 AM Frank Anderson PTA [facility]   10/18/2021 10:15 AM Frank Anderson PTA [facility]   10/22/2021  9:30 AM Frank Anderson PTA [facility]   10/25/2021  9:30 AM Frank Anderson PTA [facility]   10/29/2021  9:30 [facility]

## 2021-10-04 ENCOUNTER — HOSPITAL ENCOUNTER (OUTPATIENT)
Dept: PHYSICAL THERAPY | Age: 21
Discharge: HOME | End: 2021-10-04
Attending: NURSE PRACTITIONER
Payer: MEDICAID

## 2021-10-04 PROCEDURE — 97530 THERAPEUTIC ACTIVITIES: CPT

## 2021-10-04 PROCEDURE — 97535 SELF CARE MNGMENT TRAINING: CPT

## 2021-10-04 PROCEDURE — 97110 THERAPEUTIC EXERCISES: CPT

## 2021-10-04 PROCEDURE — 97112 NEUROMUSCULAR REEDUCATION: CPT

## 2021-10-04 NOTE — PROGRESS NOTES
PHYSICAL THERAPY - DAILY TREATMENT NOTE    Patient Name: Mike Baker        Date: 10/4/2021  : 2000   yes Patient  Verified  Visit #:      12  Insurance: Payor: Nichola Kawasaki / Plan: [address] [place] [id number] / Product Type: Managed Care Medicaid /      In time: 87 Out time:    Total Treatment Time: 55     Medicare/BCBS Time Tracking (below)   Total Timed Codes (min):  55 1:1 Treatment Time:       TREATMENT AREA =  Low back pain [M54.50]    SUBJECTIVE  Pain Level (on 0 to 10 scale):  2  / 10   Medication Changes/New allergies or changes in medical history, any new surgeries or procedures?    no  If yes, update Summary List   Subjective Functional Status/Changes:  []  No changes reported     I'm feeling pretty good today. OBJECTIVE  20 min Therapeutic Exercise:  [x]  See flow sheet   Rationale:      increase ROM, increase strength, improve coordination and improve balance to improve the patients ability to perform pain free ADLs. 20 min Therapeutic Activity: [x]  See flow sheet   Rationale:    increase ROM, increase strength, improve coordination and improve balance to improve the patients ability to perform pain free ADLs. 13 Min Self Care: HEP review. Rationale:    increase ROM, increase strength, improve coordination and improve balance to improve the patients ability to perform pain free ADLs. Billed With/As:   [x] TE   [] TA   [] Neuro   [] Self Care Patient Education: [x] Review HEP    [] Progressed/Changed HEP based on:   [] positioning   [] body mechanics   [] transfers   [] heat/ice application    [] other:      Other Objective/Functional Measures: Therex per flow sheet. Post Treatment Pain Level (on 0 to 10) scale:   0   / 10     ASSESSMENT  Assessment/Changes in Function:     No exacerbation of symptoms with today's session.       []  See Progress Note/Recertification   Patient will continue to benefit from skilled PT services to modify and progress therapeutic interventions, address functional mobility deficits, address ROM deficits, address strength deficits, analyze and address soft tissue restrictions, analyze and cue movement patterns, analyze and modify body mechanics/ergonomics and assess and modify postural abnormalities to attain remaining goals. Progress toward goals / Updated goals:    No change in progress toward LTG's with today's session.       PLAN  [x]  Upgrade activities as tolerated yes Continue plan of care   []  Discharge due to :    []  Other:      Therapist: Zenaida Bahena PTA    Date: 10/4/2021 Time: 11:51 AM     Future Appointments   Date Time Provider Raúl Ariza   10/8/2021 10:15 AM Alina Templeton,  [address] [facility]   10/11/2021 10:15 AM Alina Templeton PTA [address]   10/15/2021 10:15 AM Alina Templeton,  [address] [facility]   10/18/2021 10:15 AM Alina Templeton PTA [address]   10/22/2021  9:30 AM Alina Marcelinoa, PTA [address]   10/25/2021  9:30 AM Alina Templeton, PTA [address]   10/29/2021  9:30 AM Alina Templeton, PTA [address]

## 2021-10-08 ENCOUNTER — HOSPITAL ENCOUNTER (OUTPATIENT)
Dept: PHYSICAL THERAPY | Age: 21
Discharge: HOME | End: 2021-10-08
Attending: NURSE PRACTITIONER
Payer: MEDICAID

## 2021-10-08 PROCEDURE — 97110 THERAPEUTIC EXERCISES: CPT

## 2021-10-08 PROCEDURE — 97535 SELF CARE MNGMENT TRAINING: CPT

## 2021-10-08 PROCEDURE — 97530 THERAPEUTIC ACTIVITIES: CPT

## 2021-10-08 NOTE — PROGRESS NOTES
PHYSICAL THERAPY - DAILY TREATMENT NOTE    Patient Name: Simran Callaway        Date: 10/8/2021  : 2000   yes Patient  Verified  Visit #:      12  Insurance: Payor: Patricia Farrell / Plan: 1 [facility] 270 / Product Type: Managed Care Medicaid /      In time: 9847 Out time: ***   Total Treatment Time: ***     Medicare/BCBS Time Tracking (below)   Total Timed Codes (min):  *** 1:1 Treatment Time:  ***     TREATMENT AREA =  Low back pain [M54.50]    SUBJECTIVE  Pain Level (on 0 to 10 scale):  2  / 10   Medication Changes/New allergies or changes in medical history, any new surgeries or procedures?    no  If yes, update Summary List   Subjective Functional Status/Changes:  []  No changes reported     No new complaints. OBJECTIVE  Modalities Rationale:     decrease inflammation and decrease pain to improve patient's ability to perform pain free ADLs. min [] Estim, type/location:                                      []  att     []  unatt     []  w/US     []  w/ice    []  w/heat    min []  Mechanical Traction: type/lbs                   []  pro   []  sup   []  int   []  cont    []  before manual    []  after manual    min []  Ultrasound, settings/location:      min []  Iontophoresis w/ dexamethasone, location:                                               []  take home patch       []  in clinic   10 min [x]  Ice     []  Heat    location/position: Supine, low back.      min []  Vasopneumatic Device, press/temp:    If using vaso (only need to measure limb vaso being performed on)      pre-treatment girth :       post-treatment girth :       measured at (landmark location) :      min []  Other:    [x] Skin assessment post-treatment (if applicable):    [x]  intact    []  redness- no adverse reaction                  []redness  adverse reaction:      *** min Therapeutic Exercise:  [x]  See flow sheet   Rationale:      increase ROM, increase strength, improve coordination and improve balance to improve the patients ability to perform pain free ADLs. *** min Therapeutic Activity: [x]  See flow sheet   Rationale:    increase ROM, increase strength, improve coordination and improve balance to improve the patients ability to perform pain free ADLs. *** Min Self Care: HEP review. Rationale:    increase ROM, increase strength, improve coordination and improve balance to improve the patients ability to perform pain free ADLs. Billed With/As:   [x] TE   [] TA   [] Neuro   [] Self Care Patient Education: [x] Review HEP    [] Progressed/Changed HEP based on:   [] positioning   [] body mechanics   [] transfers   [] heat/ice application    [] other:      Other Objective/Functional Measures:    ***     Post Treatment Pain Level (on 0 to 10) scale:   ***  / 10     ASSESSMENT  Assessment/Changes in Function:     ***     []  See Progress Note/Recertification   Patient will continue to benefit from skilled PT services to modify and progress therapeutic interventions, address functional mobility deficits, address ROM deficits, address strength deficits, analyze and address soft tissue restrictions, analyze and cue movement patterns, analyze and modify body mechanics/ergonomics and assess and modify postural abnormalities to attain remaining goals.    Progress toward goals / Updated goals:    ***     PLAN  [x]  Upgrade activities as tolerated yes Continue plan of care   []  Discharge due to :    []  Other:      Therapist: Radha Carbone PTA    Date: 10/8/2021 Time: 10:46 AM     Future Appointments   Date Time Provider Raúl Ariza   10/11/2021 10:15 AM Joao Migdalia, PTA [facility]   10/15/2021 10:15 AM Joao Migdalia, PTA [facility]   10/18/2021 10:15 AM Joao Migdalia, PTA [address]   10/22/2021  9:30 AM Joao Migdalia, PTA [address]   10/25/2021  9:30 AM Joao Migdalia, PTA [address]   10/29/2021  9:30 AM Joao Migdalia, PTA [address]

## 2021-10-11 ENCOUNTER — HOSPITAL ENCOUNTER (OUTPATIENT)
Dept: PHYSICAL THERAPY | Age: 21
Discharge: HOME | End: 2021-10-11
Attending: NURSE PRACTITIONER
Payer: MEDICAID

## 2021-10-11 PROCEDURE — 97530 THERAPEUTIC ACTIVITIES: CPT

## 2021-10-11 PROCEDURE — 97112 NEUROMUSCULAR REEDUCATION: CPT

## 2021-10-11 PROCEDURE — 97110 THERAPEUTIC EXERCISES: CPT

## 2021-10-11 PROCEDURE — 97535 SELF CARE MNGMENT TRAINING: CPT

## 2021-10-11 NOTE — PROGRESS NOTES
PHYSICAL THERAPY - DAILY TREATMENT NOTE    Patient Name: Robin Vaughn        Date: 10/11/2021  : 2000   yes Patient  Verified  Visit #:      of   12  Insurance: Payor: Rey uLke / Plan: 1 [place] [id number] / Product Type: Managed Care Medicaid /      In time: 10:13 Out time: 11:05   Total Treatment Time: 52     Medicare/BCBS Time Tracking (below)   Total Timed Codes (min):  52 1:1 Treatment Time: 52     TREATMENT AREA =  Low back pain [M54.50]    SUBJECTIVE  Pain Level (on 0 to 10 scale):  0  / 10   Medication Changes/New allergies or changes in medical history, any new surgeries or procedures?    no  If yes, update Summary List   Subjective Functional Status/Changes:  []  No changes reported     No pain right now. PT seems to be helping          OBJECTIVE    16 min Therapeutic Exercise:  [x]  See flow sheet   Rationale:      increase ROM, increase strength, improve coordination and improve balance to improve the patients ability to perform pain free ADLs. 10 min Therapeutic Activity: [x]  See flow sheet   Rationale:    increase ROM, increase strength, improve coordination and improve balance to improve the patients ability to perform pain free ADLs. 10 min Neuromuscular Re-ed: [x]  See flow sheet   Rationale:    improve coordination, improve balance, and increase proprioception to improve the patients ability to perform functional mobility safely    6 min Manual Therapy: MET to correct R anterior innominate, TrP to L psoas and QL   Rationale:      decrease pain, increase ROM, increase tissue extensibility, and decrease trigger points to improve patient's ability to tolerate prolonged ambulation  The manual therapy interventions were performed at a separate and distinct time from the therapeutic activities interventions. 10 Min Self Care: HEP review.     Rationale:    increase ROM, increase strength, improve coordination and improve balance to improve the patients ability to perform pain free ADLs. Billed With/As:   [x] TE   [] TA   [] Neuro   [] Self Care Patient Education: [x] Review HEP    [] Progressed/Changed HEP based on:   [] positioning   [] body mechanics   [] transfers   [] heat/ice application    [] other:      Other Objective/Functional Measures:    Continues to Demonstrate anteriorly rotated innominate  Progressed march with core band to deadbug UE/LE with increased challenge  Patient notes she was unable to perform deadbug exercise prior to starting therapy secondary to pain - notes pain now does not limit performance     Post Treatment Pain Level (on 0 to 10) scale:   0   / 10     ASSESSMENT  Assessment/Changes in Function:     Core shaking with deadbug TrA strengthening. TrPs t/o L hip and low back likely contributing to pain. Patient reports decr compliance with HEP - likely limiting gains made with participation in PT services. []  See Progress Note/Recertification   Patient will continue to benefit from skilled PT services to modify and progress therapeutic interventions, address functional mobility deficits, address ROM deficits, address strength deficits, analyze and address soft tissue restrictions, analyze and cue movement patterns, analyze and modify body mechanics/ergonomics and assess and modify postural abnormalities to attain remaining goals. Progress toward goals / Updated goals:    1) Establish HEP. Goal in progress - intermittent performance of HEP  2) Patient will report decreased c/o pain to < or = 7/10 at the worst to facilitate prolonged standing with manageable sx in L/S.  Goal in progress  8/10 at the worst which limits ambulation (10/11/21)     PLAN  [x]  Upgrade activities as tolerated yes Continue plan of care   []  Discharge due to :    []  Other:      Therapist: Tori Holden    Date: 10/11/2021 Time: 11:51 AM     Future Appointments   Date Time Provider Raúl Ariza   10/11/2021 10:30 AM Shawn Coates [facility]   10/15/2021 10:15 AM Frank Anderson PTA [address]   10/18/2021 10:15 AM Frank Anderson PTA [facility]   10/22/2021  9:30 AM [facility]   10/25/2021  9:30 AM Shai [facility]   10/29/2021  9:30 AM Frank Anderson PTA [address]

## 2021-10-15 ENCOUNTER — HOSPITAL ENCOUNTER (OUTPATIENT)
Dept: PHYSICAL THERAPY | Age: 21
Discharge: HOME | End: 2021-10-15
Attending: NURSE PRACTITIONER
Payer: MEDICAID

## 2021-10-15 PROCEDURE — 97112 NEUROMUSCULAR REEDUCATION: CPT

## 2021-10-15 PROCEDURE — 97110 THERAPEUTIC EXERCISES: CPT

## 2021-10-15 PROCEDURE — 97530 THERAPEUTIC ACTIVITIES: CPT

## 2021-10-15 PROCEDURE — 97535 SELF CARE MNGMENT TRAINING: CPT

## 2021-10-18 ENCOUNTER — HOSPITAL ENCOUNTER (OUTPATIENT)
Dept: PHYSICAL THERAPY | Age: 21
Discharge: HOME | End: 2021-10-18
Attending: NURSE PRACTITIONER
Payer: MEDICAID

## 2021-10-18 PROCEDURE — 97535 SELF CARE MNGMENT TRAINING: CPT

## 2021-10-18 PROCEDURE — 97110 THERAPEUTIC EXERCISES: CPT

## 2021-10-18 PROCEDURE — 97112 NEUROMUSCULAR REEDUCATION: CPT

## 2021-10-18 PROCEDURE — 97530 THERAPEUTIC ACTIVITIES: CPT

## 2021-10-18 NOTE — PROGRESS NOTES
PHYSICAL THERAPY - DAILY TREATMENT NOTE    Patient Name: Isrrael Martínez        Date: 10/18/2021  : 2000   yes Patient  Verified  Visit #:   8      12  Insurance: Payor: Danisha  / Plan: 1 [facility] 270 / Product Type: Managed Care Medicaid /      In time: 10:15 Out time: 11:10   Total Treatment Time: 55     Medicare/BCBS Time Tracking (below)   Total Timed Codes (min):  55 1:1 Treatment Time:      TREATMENT AREA =  Low back pain [M54.50]    SUBJECTIVE  Pain Level (on 0 to 10 scale):  0  / 10   Medication Changes/New allergies or changes in medical history, any new surgeries or procedures?    no  If yes, update Summary List   Subjective Functional Status/Changes:  []  No changes reported     \"Left work early yesterday due to increase in back pain (prolonged standing) and I had a headache. No pain or headache today. \"       OBJECTIVE  Modalities Rationale:     decrease inflammation and decrease pain to improve patient's ability to perform pain free ADLs.     min [] Estim, type/location:                                      []  att     []  unatt     []  w/US     []  w/ice    []  w/heat    min []  Mechanical Traction: type/lbs                   []  pro   []  sup   []  int   []  cont    []  before manual    []  after manual    min []  Ultrasound, settings/location:      min []  Iontophoresis w/ dexamethasone, location:                                               []  take home patch       []  in clinic   10 min [x]  Ice     []  Heat    location/position: Supine to L/S    min []  Vasopneumatic Device, press/temp:    If using vaso (only need to measure limb vaso being performed on)      pre-treatment girth :       post-treatment girth :       measured at (landmark location) :      min []  Other:    [x] Skin assessment post-treatment (if applicable):    [x]  intact    []  redness- no adverse reaction                  []redness  adverse reaction:        10 min Therapeutic Exercise:  [x]  See flow sheet   Rationale:      increase ROM, increase strength, improve coordination and improve balance to improve the patients ability to perform pain free ADLs. 10   min Therapeutic Activity: [x]  See flow sheet   Rationale:    increase ROM, increase strength, improve coordination and improve balance to improve the patients ability to perform pain free ADLs. 10 min Neuromuscular Re-ed: [x]  See flow sheet   Rationale:    improve coordination, improve balance, and increase proprioception to improve the patients ability to perform functional mobility safely    7NC min Manual Therapy: MET to correct L anterior innominate (pt self corrected), DTM to L>R L/S. Rationale:      decrease pain, increase ROM, increase tissue extensibility, and decrease trigger points to improve patient's ability to tolerate prolonged ambulation  The manual therapy interventions were performed at a separate and distinct time from the therapeutic activities interventions. [place]: Instructed in MET self correction for home with print out. Rationale:    increase ROM, increase strength, improve coordination and improve balance to improve the patients ability to perform pain free ADLs. Billed With/As:   [x] TE   [] TA   [] Neuro   [] Self Care Patient Education: [x] Review HEP    [] Progressed/Changed HEP based on:   [] positioning   [] body mechanics   [] transfers   [] heat/ice application    [] other:      Other Objective/Functional Measures:    L ant innom. Tight throughout L>R lower lumbar paraspinals into prox glutes. Progressed reps with clams and bridges. Post Treatment Pain Level (on 0 to 10) scale:   0   / 10     ASSESSMENT  Assessment/Changes in Function:     Pt able to self correct L ant innom in clinic and instructed for home use.     No sx exacerbation with increased TE.      []  See Progress Note/Recertification   Patient will continue to benefit from skilled PT services to modify and progress therapeutic interventions, address functional mobility deficits, address ROM deficits, address strength deficits, analyze and address soft tissue restrictions, analyze and cue movement patterns, analyze and modify body mechanics/ergonomics and assess and modify postural abnormalities to attain remaining goals. Progress toward goals / Updated goals:    1) Establish HEP. Goal in progress - intermittent performance of HEP 10/15/21  2) Patient will report decreased c/o pain to < or = 7/10 at the worst to facilitate prolonged standing with manageable sx in L/S.  Goal in progress  8/10 at the worst which limits ambulation (10/11/21)     PLAN  [x]  Upgrade activities as tolerated yes Continue plan of care   []  Discharge due to :    []  Other:      Therapist: Adria Salazar PTA    Date: 10/18/2021 Time: 11:51 AM     Future Appointments   Date Time Provider Raúl Ariza   10/18/2021 10:15 AM [address] [facility]   10/20/2021  6:00 PM Darrius Staff [facility]   10/25/2021  5:15 PM Reyna Gage

## 2021-10-20 ENCOUNTER — HOSPITAL ENCOUNTER (OUTPATIENT)
Dept: PHYSICAL THERAPY | Age: 21
End: 2021-10-20
Attending: NURSE PRACTITIONER
Payer: MEDICAID

## 2021-10-20 NOTE — PROGRESS NOTES
PHYSICAL THERAPY - DAILY TREATMENT NOTE    Patient Name: Vivek Houston        Date: 10/20/2021  : 2000   yes Patient  Verified  Visit #:   5   of   12  Insurance: Payor: [address] [place]D / Plan: 1 [place] [id number] / Product Type: Managed Care Medicaid /      In time: 10:40 Out time: 11:45   Total Treatment Time: 65     Medicare/BCBS Time Tracking (below)   Total Timed Codes (min):  NA 1:1 Treatment Time:  NA     TREATMENT AREA =  Other low back pain [M54.59]    ** Unable to properly document on this date of service, treating PTA did not complete note and has since left the company, therefore will not bill for this session **    SUBJECTIVE  Pain Level (on 0 to 10 scale):  2  / 10   Medication Changes/New allergies or changes in medical history, any new surgeries or procedures?    no  If yes, update Summary List   Subjective Functional Status/Changes:  []  No changes reported     No new c/o          OBJECTIVE      NA min Therapeutic Exercise:  [x]  See flow sheet        NA min Manual Therapy:        NA min Therapeutic Activity: [x]  See flow sheet       NA min Neuromuscular Re-ed: [x]  See flow sheet          min Self Care:        Billed With/As:   [] TE   [] TA   [] Neuro   [] Self Care Patient Education: [x] Review HEP    [] Progressed/Changed HEP based on:   [] positioning   [] body mechanics   [] transfers   [] heat/ice application    [] other:        PLAN  []  Upgrade activities as tolerated yes Continue plan of care   []  Discharge due to :    []  Other:        ** Unable to properly document on this date of service, treating PTA did not complete note and has since left the company, therefore will not bill for this session **    Therapist: ** Luz Cody PT, DPT, OCS, CSCS completing note for Estuardo Gillespie PTA who left company unexpectedly **    Date: 10/20/2021 Time: 9:48 AM     Future Appointments   Date Time Provider Raúl Ariza   10/20/2021  6:00 PM Taj [facility]   [facility]25/2021  [facility]

## 2021-10-22 ENCOUNTER — APPOINTMENT (OUTPATIENT)
Dept: PHYSICAL THERAPY | Age: 21
End: 2021-10-22
Attending: NURSE PRACTITIONER
Payer: MEDICAID

## 2021-10-25 ENCOUNTER — HOSPITAL ENCOUNTER (OUTPATIENT)
Dept: PHYSICAL THERAPY | Age: 21
Discharge: HOME | End: 2021-10-25
Attending: NURSE PRACTITIONER
Payer: MEDICAID

## 2021-10-25 ENCOUNTER — APPOINTMENT (OUTPATIENT)
Dept: PHYSICAL THERAPY | Age: 21
End: 2021-10-25
Attending: NURSE PRACTITIONER
Payer: MEDICAID

## 2021-10-25 PROCEDURE — 97535 SELF CARE MNGMENT TRAINING: CPT

## 2021-10-25 PROCEDURE — 97530 THERAPEUTIC ACTIVITIES: CPT

## 2021-10-25 PROCEDURE — 97110 THERAPEUTIC EXERCISES: CPT

## 2021-10-25 NOTE — PROGRESS NOTES
[address] [facility] THERAPY  [address] [facility], [facility] [address],[facility], [address] [facility] - Phone: [phone]  Fax: [phone]  PROGRESS NOTE  Patient Name: lEizabeth Krishna : 2000   Treatment/Medical Diagnosis: Other low back pain [M54.59]   Referral Source: Geraldo Condon NP     Date of Initial Visit: 21 Attended Visits: 9 Missed Visits: CX: 1  NS: 0     SUMMARY OF TREATMENT  Patient has attended 9 PT sessions, including an initial evaluation for low back pain. PT interventions have included manual therapy, therapeutic exercises, patient education (self SIJ MET correction), and HEP to improve spinal mobility/flexibility and core/LE strength/stability. CP for pain control. CURRENT STATUS  Patient has made steady progress with PT interventions with low back pain. Patient reports 60% overall improvement since beginning PT. Demonstrates good HEP compliance, specifically good but temporary relief of symptoms with MET correction. Reports she is typically hurting more R>L low back, however will sporadically notice the opposite when she rises in the AM.    Pain in the last 2 weeks: current 2-3/10, at worst 8/10, at best 0/10, avg pain 5/10  Pain description: typically one sided in the SIJ  Aggravating factors: prolonged static standing (>10 minutes) specifically at work  Alleviating factors: rest, sitting in a recliner, ice  Functional improvements: decrease frequency & duration of pain, improved ease with transfers  Current objective findings: SIJ assessment: L ant innominate/R post innominate, increase tenderness in L QL, L glute med/piri; 80% bridge        Goal/Measure of Progress Goal Met? 1. Patient to be independent & compliant with a progressive, high level HEP in order to maintain gains made in physical therapy. Status at Last Eval: Basic HEP established Current Status: I and compliant with basic HEP progressing   2.   Patient will report decreased c/o pain to < or = 5/10 at the worst to facilitate prolonged standing with manageable sx in L/S. Status at Last Eval: 10/10 Current Status: 8/10 progressing   3. Pt will report a GROC score of +4 (a moderately better) indicating improved symptoms and function   Status at Last Eval: Goal established Current Status: +3 progressing   4. Patient to perform 2 x 10 full bridges indicating improved core strength to improve ambulation around the grocery store. Status at Last Eval: 75% with pain Current Status: 80% bridge 2x10  progressing     New Goals to be achieved in __4__  weeks:  1. Patient to be independent & compliant with a progressive, high level HEP in order to maintain gains made in physical therapy. 2. Patient will report decreased c/o pain to < or = 5/10 at the worst to facilitate prolonged standing with manageable sx in L/S.  3. Pt will report a GROC score of +4 (a moderately better) indicating improved symptoms and function  4. Patient to perform 2 x 10 full bridges indicating improved core strength to improve ambulation around the grocery store. RECOMMENDATIONS  Recommend 1-2x/week for 4 weeks to continue to improve spinal mobility/flexibility and core/LE strength/stability to tolerate prolonged functional mobility activities and work duties with decrease c/o symptoms. Thank you for this referral.  If you have any questions/comments please contact us directly at [phone]. Thank you for allowing us to assist in the care of your patient.     Therapist Signature: KRISTINA Rsoa Date: 10/26/21    Geovani Reen DPT Time: 8:24 AM   NOTE TO PHYSICIAN:  PLEASE COMPLETE THE ORDERS BELOW AND FAX TO   [facility] [place] [facility]: [phone]  If you are unable to process this request in 24 hours please contact our office: [phone]    ___ I have read the above report and request that my patient continue as recommended.   ___ I have read the above report and request that my patient continue therapy with the following changes/special instructions:_________________________________________________________   ___ I have read the above report and request that my patient be discharged from therapy.      Physician Signature:        Date:       Time:                                 Budd Koyanagi, NP

## 2021-10-25 NOTE — PROGRESS NOTES
PHYSICAL THERAPY - DAILY TREATMENT NOTE    Patient Name: Maureen Oviedo        Date: 10/25/2021  : 2000   yes Patient  Verified  Visit #:     Insurance: Payor: Ewa Blanco / Plan: 1 [facility] 270 / Product Type: Managed Care Medicaid /      In time: 5:07 Out time: 6:12   Total Treatment Time: 65     Medicare/BCBS Time Tracking (below)   Total Timed Codes (min):  48 1:1 Treatment Time: n/a     TREATMENT AREA =  Other low back pain [M54.59]    SUBJECTIVE  Pain Level (on 0 to 10 scale):  2-3 / 10   Medication Changes/New allergies or changes in medical history, any new surgeries or procedures?    no  If yes, update Summary List   Subjective Functional Status/Changes:  []  No changes reported     Patient reports feeling a lot better since being shown how to do a self MET correction LV. States she has to correct her hips every day. SEE PN    Patient reports 60% overall improvement since beginning PT. Pain in the last 2 weeks: current 2-3/10, at worst 8/10, at best 0/10, avg pain 5/10  Pain description: typically one sided in the SIJ  Aggravating factors: prolonged static standing (>10 minutes)  Alleviating factors: rest, sitting in a recliner, ice  Functional improvements: decrease frequency & duration of pain, improved ease with transfers             OBJECTIVE  Modalities Rationale:     decrease inflammation and decrease pain to improve patient's ability to perform pain free ADLs.     min [] Estim, type/location:                                      []  att     []  unatt     []  w/US     []  w/ice    []  w/heat    min []  Mechanical Traction: type/lbs                   []  pro   []  sup   []  int   []  cont    []  before manual    []  after manual    min []  Ultrasound, settings/location:      min []  Iontophoresis w/ dexamethasone, location:                                               []  take home patch       []  in clinic   10 Min [x]  Ice     [] Heat    Location/position: To L/S in semirecline with LE on wedge post-session    min []  Vasopneumatic Device, press/temp:    If using vaso (only need to measure limb vaso being performed on)      pre-treatment girth :       post-treatment girth :       measured at (landmark location) :      min []  Other:    [x] Skin assessment post-treatment (if applicable):    [x]  intact    []  redness- no adverse reaction                  []redness  adverse reaction:        20 min Therapeutic Exercise:  [x]  See flow sheet   Rationale:      increase ROM, increase strength, improve coordination and improve balance to improve the patients ability to perform pain free ADLs. 10   min Therapeutic Activity: [x]  See flow sheet   Rationale:    increase ROM, increase strength, improve coordination and improve balance to improve the patients ability to complete transfers. 7NB min Manual Therapy: MET to correct L anterior innominate   Rationale:      decrease pain, increase ROM, increase tissue extensibility, and decrease trigger points to improve patient's ability to tolerate prolonged ambulation  The manual therapy interventions were performed at a separate and distinct time from the therapeutic activities interventions. 18 min Self Care: Reassessment. Reviewed HEP. Reviewed current diagnosis, prognosis, and POC. Rationale:  to improve understanding of current diagnosis with realistic expectation of PT to improve compliance/adherence and satisfaction. Billed With/As:   [x] MADHURI   [] LUNA   [] Neuro   [] Self Care Patient Education: [x] Review HEP:   [facility]   [facility] 10/25/21     [x] Progressed/Changed HEP based on:   [] Addressed barriers and behaviors     [] Therapeutic Neuroscience Pain Education, metaphor, reframing, contexts.     [] Sleep Education   [] Body Mechanics [] Healing Timeframe     [] Self STM with ball at \"the spot\"  [] Walking Program/Global Activity   [] other: Other Objective/Functional Measures:    Access Code: [email or web address]  URL: [email or web address]  Date: 10/25/2021  Prepared by: Shey Solano    Program Notes  PERFORM ALL EXERCISES TO YOUR TOLERANCE. IF SHARP PAIN OR RED FLAGS OCCUR, IMMEDIATELY STOP EXERCISE. Exercises  Hooklying Sacroiliac Joint Isometric  Supine Bridge - 2 x daily - 5-7 x weekly - 2 sets - 10 reps - 5 hold  Dead Bug - 2 x daily - 5-7 x weekly - 2 sets - 10 reps  USE ORANGE BAND -Clamshell with Resistance - 2 x daily - 3-4 x weekly - 10 reps - 5 hold  YELLOW LOOPED BAND - Side Stepping with Resistance at Ankles - 1 x daily - 3-4 x weekly - 2 sets - 10 reps    SEE PN     Post Treatment Pain Level (on 0 to 10) scale:   1   / 10     ASSESSMENT  Assessment/Changes in Function:     SEE PN     []  See Progress Note/Recertification   Patient will continue to benefit from skilled PT services to modify and progress therapeutic interventions, address functional mobility deficits, address ROM deficits, address strength deficits, analyze and address soft tissue restrictions, analyze and cue movement patterns, analyze and modify body mechanics/ergonomics and assess and modify postural abnormalities to attain remaining goals.    Progress toward goals / Updated goals:    SEE PN     PLAN  [x]  Upgrade activities as tolerated yes Continue plan of care   []  Discharge due to :    []  Other:      Therapist: Kiah Garcia    Date: 10/25/2021 Time: 6:55 PM     Future Appointments   Date Time Provider Raúl Ariza   10/25/2021  5:15 PM Yeny Avila

## 2021-10-29 ENCOUNTER — APPOINTMENT (OUTPATIENT)
Dept: PHYSICAL THERAPY | Age: 21
End: 2021-10-29
Attending: NURSE PRACTITIONER
Payer: MEDICAID

## 2021-11-01 ENCOUNTER — HOSPITAL ENCOUNTER (OUTPATIENT)
Dept: PHYSICAL THERAPY | Age: 21
Discharge: HOME | End: 2021-11-01
Attending: NURSE PRACTITIONER
Payer: MEDICAID

## 2021-11-01 PROCEDURE — 97110 THERAPEUTIC EXERCISES: CPT

## 2021-11-01 PROCEDURE — 97535 SELF CARE MNGMENT TRAINING: CPT

## 2021-11-01 PROCEDURE — 97530 THERAPEUTIC ACTIVITIES: CPT

## 2021-11-01 NOTE — PROGRESS NOTES
PHYSICAL THERAPY - DAILY TREATMENT NOTE    Patient Name: Rosita Manual        Date: 2021  : 2000   yes Patient  Verified  Visit #:   10   of   12  Insurance: Payor: Alexia Mcdonough / Plan: 1 [place] [address] / Product Type: Managed Care Medicaid /      In time: 11:19 Out time: 12:14   Total Treatment Time: 55     Medicare/BCBS Time Tracking (below)   Total Timed Codes (min):  45 1:1 Treatment Time: n/a     TREATMENT AREA =  Other low back pain [M54.59]    SUBJECTIVE  Pain Level (on 0 to 10 scale): 0/ 10   Medication Changes/New allergies or changes in medical history, any new surgeries or procedures?    no  If yes, update Summary List   Subjective Functional Status/Changes:  []  No changes reported     Worked 11-7 for 4 days in a row then had a lot of L>R low back pain by the 3rd day. Stated she tried to correct her hips at work when she had pain but felt more pain. Currently reports no pain this morning. OBJECTIVE  Modalities Rationale:     decrease inflammation and decrease pain to improve patient's ability to perform pain free ADLs.     min [] Estim, type/location:                                      []  att     []  unatt     []  w/US     []  w/ice    []  w/heat    min []  Mechanical Traction: type/lbs                   []  pro   []  sup   []  int   []  cont    []  before manual    []  after manual    min []  Ultrasound, settings/location:      min []  Iontophoresis w/ dexamethasone, location:                                               []  take home patch       []  in clinic   10 Min [x]  Ice     []  Heat    Location/position: To L/S in semirecline with LE on wedge post-session    min []  Vasopneumatic Device, press/temp:    If using vaso (only need to measure limb vaso being performed on)      pre-treatment girth :       post-treatment girth :       measured at (landmark location) :      min []  Other:    [x] Skin assessment post-treatment (if applicable):    [x]  intact    []  redness- no adverse reaction                  []redness  adverse reaction:        15 min Therapeutic Exercise:  [x]  See flow sheet   Rationale:      increase ROM, increase strength, improve coordination and improve balance to improve the patients ability to perform pain free ADLs. 20   min Therapeutic Activity: [x]  See flow sheet   Rationale:    increase ROM, increase strength, improve coordination and improve balance to improve the patients ability to complete transfers. 10 min Self Care: Reviewed HEP. Reviewed current diagnosis, prognosis, and POC    Rationale:  to improve understanding of current diagnosis with realistic expectation of PT to improve compliance/adherence and satisfaction. Billed With/As:   [x] TE   [] TA   [] Neuro   [] Self Care Patient Education: [x] Review HEP:   [employer] Access Code Date Issued   [facility][1] 10/25/21     [x] Progressed/Changed HEP based on:   [] Addressed barriers and behaviors     [] Therapeutic Neuroscience Pain Education, metaphor, reframing, contexts. [] Sleep Education   [] Body Mechanics [] Healing Timeframe     [] Self STM with ball at \"the spot\"  [] Walking Program/Global Activity   [] other:          Other Objective/Functional Measures:    *progressed exercises to improve spinal mobility/flexibility (see flowsheet)  *initially noted discomfort with 1st repetition, able to complete exercise following cues for glute/core set. Post Treatment Pain Level (on 0 to 10) scale:  0  / 10     ASSESSMENT  Assessment/Changes in Function:     Patient required cues during new exercises for proper form/technique. Denied any increase in pain level following PT session. Educated pt to discharge standing self MET correction technique due to elevated pain. Patient acknowledged understanding.       []  See Progress Note/Recertification   Patient will continue to benefit from skilled PT services to modify and progress therapeutic interventions, address functional mobility deficits, address ROM deficits, address strength deficits, analyze and address soft tissue restrictions, analyze and cue movement patterns, analyze and modify body mechanics/ergonomics and assess and modify postural abnormalities to attain remaining goals. Progress toward goals / Updated goals:    New Goals to be achieved in __4__  weeks since last PN on 10/26:  1. Patient to be independent & compliant with a progressive, high level HEP in order to maintain gains made in physical therapy. 2. Patient will report decreased c/o pain to < or = 5/10 at the worst to facilitate prolonged standing with manageable sx in L/S.  3. Pt will report a GROC score of +4 (a moderately better) indicating improved symptoms and function  4. Patient to perform 2 x 10 full bridges indicating improved core strength to improve ambulation around the grocery store.     No significant progress towards PT goals today     PLAN  [x]  Upgrade activities as tolerated yes Continue plan of care   []  Discharge due to :    []  Other:      Therapist: Brennen Morales    Date: 11/1/2021 Time: 12:56 PM     Future Appointments   Date Time Provider Raúl Ariza   11/1/2021 11:15 AM [facility]   11/5/2021 12:45 PM [facility]

## 2021-11-05 ENCOUNTER — HOSPITAL ENCOUNTER (OUTPATIENT)
Dept: PHYSICAL THERAPY | Age: 21
Discharge: HOME | End: 2021-11-05
Attending: NURSE PRACTITIONER
Payer: MEDICAID

## 2021-11-05 PROCEDURE — 97530 THERAPEUTIC ACTIVITIES: CPT

## 2021-11-05 PROCEDURE — 97535 SELF CARE MNGMENT TRAINING: CPT

## 2021-11-05 PROCEDURE — 97110 THERAPEUTIC EXERCISES: CPT

## 2021-11-05 NOTE — PROGRESS NOTES
PHYSICAL THERAPY - DAILY TREATMENT NOTE    Patient Name: Franc Fuentes        Date: 2021  : 2000   yes Patient  Verified  Visit #:     Insurance: Payor: [address] [place],9D / Plan: 1 [facility] 270 / Product Type: Managed Care Medicaid /      In time: 12:39 Out time: 1:31   Total Treatment Time: 52     Medicare/BCBS Time Tracking (below)   Total Timed Codes (min):  52 1:1 Treatment Time: n/a     TREATMENT AREA =  Other low back pain [M54.59]    SUBJECTIVE  Pain Level (on 0 to 10 scale): 0.5/ 10   Medication Changes/New allergies or changes in medical history, any new surgeries or procedures?    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 OBJECTIVE      22 min Therapeutic Exercise:  [x]  See flow sheet   Rationale:      increase ROM, increase strength, improve coordination and improve balance to improve the patients ability to perform pain free ADLs. 20 min Therapeutic Activity: [x]  See flow sheet   Rationale:    increase ROM, increase strength, improve coordination and improve balance to improve the patients ability to complete transfers. [address]: Educated on use of tennis/lacrosse ball to QL and glute on painful side to reduce tissue tightness. Rationale:  to improve understanding of current diagnosis with realistic expectation of PT to improve compliance/adherence and satisfaction    Billed With/As:   [x] TE   [] TA   [] Neuro   [] Self Care Patient Education: [x] Review HEP:   [employer] Access Code Date Issued   [facility] 10/25/21     [x] Progressed/Changed HEP based on:   [] Addressed barriers and behaviors     [] Therapeutic Neuroscience Pain Education, metaphor, reframing, contexts. [] Sleep Education   [] Body Mechanics [] Healing Timeframe     [x] Education on self STM with ball at \"the spot\" at [facility] and glute (11/05)  [] Walking Program/Global Activity   [] other:          Other Objective/Functional Measures:    *added standing plank at table on elbows and added mini squats at table to improve core/LE strength   *performed hip extension in 1/2 prone      Post Treatment Pain Level (on 0 to 10) scale:  0.5  / 10     ASSESSMENT  Assessment/Changes in Function:     Patient notes pain with lying on her back. Typically lies on her stomach or side to sleep. Therefore focused/modified PT session on standing and sitting exercises. Patient presented with slight R ant innominate at beginning of session. Able to correct with self STM (with TrP ball) to R QL and glute followed by R hip hike at stairs to re-engage QL   No change in pain level following PT session     []  See Progress Note/Recertification   Patient will continue to benefit from skilled PT services to modify and progress therapeutic interventions, address functional mobility deficits, address ROM deficits, address strength deficits, analyze and address soft tissue restrictions, analyze and cue movement patterns, analyze and modify body mechanics/ergonomics and assess and modify postural abnormalities to attain remaining goals. Progress toward goals / Updated goals:    New Goals to be achieved in __4__  weeks since last PN on 10/26:  1. Patient to be independent & compliant with a progressive, high level HEP in order to maintain gains made in physical therapy. 2. Patient will report decreased c/o pain to < or = 5/10 at the worst to facilitate prolonged standing with manageable sx in L/S. Progressing 11/05 indicated by pre vs post tx pain levels  3. Pt will report a GROC score of +4 (a moderately better) indicating improved symptoms and function  4.  Patient to perform 2 x 10 full bridges indicating improved core strength to improve ambulation around the grocery store. PLAN  [x]  Upgrade activities as tolerated yes Continue plan of care   []  Discharge due to :    []  Other:      Therapist: KRISTINA Thakkar    Date: 11/5/2021 Time: 2:12 PM     No future appointments.

## 2021-12-13 ENCOUNTER — TELEPHONE (OUTPATIENT)
Dept: FAMILY MEDICINE CLINIC | Age: 21
End: 2021-12-13

## 2021-12-13 DIAGNOSIS — M54.42 CHRONIC BILATERAL LOW BACK PAIN WITH BILATERAL SCIATICA: Primary | ICD-10-CM

## 2021-12-13 DIAGNOSIS — M54.41 CHRONIC BILATERAL LOW BACK PAIN WITH BILATERAL SCIATICA: Primary | ICD-10-CM

## 2021-12-13 DIAGNOSIS — G89.29 CHRONIC BILATERAL LOW BACK PAIN WITH BILATERAL SCIATICA: Primary | ICD-10-CM

## 2021-12-13 NOTE — TELEPHONE ENCOUNTER
Patient called in regards to physical therapy not working . She has been going for several months . She wants to know if she could get a MRI done .

## 2021-12-14 NOTE — TELEPHONE ENCOUNTER
That's interesting because her PT notes suggest otherwise. Can see if her insurance will approve an MRI but can't guarantee anything.

## 2021-12-14 NOTE — TELEPHONE ENCOUNTER
Patient stated that her insurance stated if she did at least six weeks of physical therapy then they will possibly pay for it. She stated she wanted us to try. I advised her there is no Guarantee. She voiced understanding.

## 2021-12-23 ENCOUNTER — HOSPITAL ENCOUNTER (OUTPATIENT)
Dept: MRI IMAGING | Age: 21
Discharge: HOME | End: 2021-12-23
Attending: NURSE PRACTITIONER
Payer: MEDICAID

## 2021-12-23 DIAGNOSIS — M54.41 CHRONIC BILATERAL LOW BACK PAIN WITH BILATERAL SCIATICA: ICD-10-CM

## 2021-12-23 DIAGNOSIS — G89.29 CHRONIC BILATERAL LOW BACK PAIN WITH BILATERAL SCIATICA: ICD-10-CM

## 2021-12-23 DIAGNOSIS — M54.42 CHRONIC BILATERAL LOW BACK PAIN WITH BILATERAL SCIATICA: ICD-10-CM

## 2021-12-23 PROCEDURE — 72148 MRI LUMBAR SPINE W/O DYE: CPT

## 2022-01-10 NOTE — PROGRESS NOTES
[address] [facility] PHYSICAL THERAPY  [address] [facility], [place], [address] [facility] - Phone: [phone]  Fax: [phone]  DISCHARGE SUMMARY  Patient Name: Simran Callaway : 2000   Treatment/Medical Diagnosis: Other low back pain [M54.59]   Referral Source: Sanchez Condon NP     Date of Initial Visit: 21 Attended Visits: 11 Missed Visits: 2     SUMMARY OF TREATMENT  Patient attended 11 PT sessions, including an initial evaluation, for low back pain from 21 to 21. PT interventions included manual therapy, therapeutic exercises, patient education (self SIJ MET correction), and HEP to improve spinal mobility/flexibility and core/LE strength/stability. CP for pain control. CURRENT STATUS  Patient was making steady progress with PT interventions for low back pain. Patient attended 2 follow up appointments following last progress note on 10/25/21 and was last seen on 21. Patient did not return to physical therapy following last appointment, therefore a formal reassessment of goals was not performed. Patient is now discharged from physical therapy due to not returning and >30 day lapse  in PT treatment. Thank you for this referral.     Goal/Measure of Progress Goal Met? 1. Patient to be independent & compliant with a progressive, high level HEP in order to maintain gains made in physical therapy. Status at Last Eval: I and compliant with basic HEP Current Status: Unable to assess n/a   2. Patient will report decreased c/o pain to < or = 5/10 at the worst to facilitate prolonged standing with manageable sx in L/S. Status at Last Eval: 8/10 Current Status: Unable to assess n/a   3. Pt will report a GROC score of +4 (a moderately better) indicating improved symptoms and function   Status at Last Eval: +3 Current Status: Unable to assess n/a   4.   Patient to perform 2 x 10 full bridges indicating improved core strength to improve ambulation around the grocery store. Status at Last Eval: 80% bridge 2x10  Current Status: Unable to assess n/a         RECOMMENDATIONS  Other: Discontinue therapy due to pt not returning and >30 day lapse in PT treatment. Thank you for this referral.     If you have any questions/comments please contact us directly at [phone]. Thank you for allowing us to assist in the care of your patient. Therapist Signature: Darryle Drafts Date: 01/10/22    Blanquita Cardozo DPT Time: 9:13 AM       NOTE TO PHYSICIAN:  Your patient's insurance requires this discharge note be signed and returned. PLEASE COMPLETE THE ORDERS BELOW AND RETURN TO:  [facility] PHYSICAL THERAPY    ___ I have read the above report and request that my patient be discharged from therapy.      Physician Signature:        Date:       Time:                                        Bhupinder Vallejo NP

## 2022-03-19 PROBLEM — K81.0 ACUTE CHOLECYSTITIS: Status: ACTIVE | Noted: 2017-08-17

## 2022-03-19 PROBLEM — Z34.93 PREGNANT AND NOT YET DELIVERED IN THIRD TRIMESTER: Status: ACTIVE | Noted: 2017-07-03

## 2022-12-10 NOTE — PROGRESS NOTES
PHYSICAL THERAPY - DAILY TREATMENT NOTE    Patient Name: Promise Dowling        Date: 10/15/2021  : 2000   yes Patient  Verified  Visit #:     Insurance: Payor: Van Iggyjennifer / Plan: 1 [facility] 270 / Product Type: Managed Care Medicaid /      In time: 10:17 Out time: 11:15   Total Treatment Time: 58     Medicare/BCBS Time Tracking (below)   Total Timed Codes (min):  58 1:1 Treatment Time:      TREATMENT AREA =  Low back pain [M54.50]    SUBJECTIVE  Pain Level (on 0 to 10 scale):  0  / 10   Medication Changes/New allergies or changes in medical history, any new surgeries or procedures?    no  If yes, update Summary List   Subjective Functional Status/Changes:  []  No changes reported     \"Slowly getting better but cont to have pain that shoots down my leg. Sometimes I have difficulty sleeping. Performing HEP every other day. The heat made me feel worse the other day. \"       OBJECTIVE  Modalities Rationale:     decrease inflammation and decrease pain to improve patient's ability to perform pain free ADLs.     min [] Estim, type/location:                                      []  att     []  unatt     []  w/US     []  w/ice    []  w/heat    min []  Mechanical Traction: type/lbs                   []  pro   []  sup   []  int   []  cont    []  before manual    []  after manual    min []  Ultrasound, settings/location:      min []  Iontophoresis w/ dexamethasone, location:                                               []  take home patch       []  in clinic   10 min [x]  Ice     []  Heat    location/position: Supine to L/S    min []  Vasopneumatic Device, press/temp:    If using vaso (only need to measure limb vaso being performed on)      pre-treatment girth :       post-treatment girth :       measured at (landmark location) :      min []  Other:    [x] Skin assessment post-treatment (if applicable):    [x]  intact    []  redness- no adverse reaction []redness  adverse reaction:        10 min Therapeutic Exercise:  [x]  See flow sheet   Rationale:      increase ROM, increase strength, improve coordination and improve balance to improve the patients ability to perform pain free ADLs. 10   min Therapeutic Activity: [x]  See flow sheet   Rationale:    increase ROM, increase strength, improve coordination and improve balance to improve the patients ability to perform pain free ADLs. 10 min Neuromuscular Re-ed: [x]  See flow sheet   Rationale:    improve coordination, improve balance, and increase proprioception to improve the patients ability to perform functional mobility safely    8NC min Manual Therapy: MET to correct R anterior innominate, TrP to L QL   Rationale:      decrease pain, increase ROM, increase tissue extensibility, and decrease trigger points to improve patient's ability to tolerate prolonged ambulation  The manual therapy interventions were performed at a separate and distinct time from the therapeutic activities interventions. 10 Min Self Care: HEP review, Encourage to perform 1-2x daily. Instructed in use of tennis ball for TRPR. Provided OTB for clams at home. Rationale:    increase ROM, increase strength, improve coordination and improve balance to improve the patients ability to perform pain free ADLs. Billed With/As:   [x] TE   [] TA   [] Neuro   [] Self Care Patient Education: [x] Review HEP    [] Progressed/Changed HEP based on:   [] positioning   [] body mechanics   [] transfers   [] heat/ice application    [] other:      Other Objective/Functional Measures:    R ant innom. TRP to L QL. Post Treatment Pain Level (on 0 to 10) scale:   0   / 10     ASSESSMENT  Assessment/Changes in Function:     Cont to present with R ant innom but easily corrected with MET and able to maintain throughout today's Tx session. Cont pain with first 2-3 bridges despite TA draw and glut sq prior to lifting.  No adverse effects with today's Tx. Good tolerance to trial of ice. []  See Progress Note/Recertification   Patient will continue to benefit from skilled PT services to modify and progress therapeutic interventions, address functional mobility deficits, address ROM deficits, address strength deficits, analyze and address soft tissue restrictions, analyze and cue movement patterns, analyze and modify body mechanics/ergonomics and assess and modify postural abnormalities to attain remaining goals. Progress toward goals / Updated goals:    1) Establish HEP. Goal in progress - intermittent performance of HEP 10/15/21  2) Patient will report decreased c/o pain to < or = 7/10 at the worst to facilitate prolonged standing with manageable sx in L/S.  Goal in progress  8/10 at the worst which limits ambulation (10/11/21)     PLAN  [x]  Upgrade activities as tolerated yes Continue plan of care   []  Discharge due to :    []  Other:      Therapist: Michael Wilcox PTA    Date: 10/15/2021 Time: 11:51 AM     Future Appointments   Date Time Provider Raúl Ariza   10/15/2021 10:15 AM [address] [facility] 2 [facility]   10/18/2021 10:15 AM [facility] 2 [place] [id number] Pneumonia Yes

## 2024-11-20 ENCOUNTER — OFFICE VISIT (OUTPATIENT)
Dept: FAMILY MEDICINE CLINIC | Facility: CLINIC | Age: 24
End: 2024-11-20
Payer: MEDICAID

## 2024-11-20 VITALS
DIASTOLIC BLOOD PRESSURE: 84 MMHG | HEIGHT: 67 IN | TEMPERATURE: 98 F | WEIGHT: 245 LBS | OXYGEN SATURATION: 100 % | BODY MASS INDEX: 38.45 KG/M2 | SYSTOLIC BLOOD PRESSURE: 118 MMHG | RESPIRATION RATE: 16 BRPM | HEART RATE: 81 BPM

## 2024-11-20 DIAGNOSIS — Z00.00 ANNUAL PHYSICAL EXAM: ICD-10-CM

## 2024-11-20 DIAGNOSIS — Z76.89 ESTABLISHING CARE WITH NEW DOCTOR, ENCOUNTER FOR: Primary | ICD-10-CM

## 2024-11-20 DIAGNOSIS — M25.551 RIGHT HIP PAIN: ICD-10-CM

## 2024-11-20 DIAGNOSIS — Z30.016 ENCOUNTER FOR INITIAL PRESCRIPTION OF TRANSDERMAL PATCH HORMONAL CONTRACEPTIVE DEVICE: ICD-10-CM

## 2024-11-20 PROBLEM — K81.0 ACUTE CHOLECYSTITIS: Status: RESOLVED | Noted: 2017-08-17 | Resolved: 2024-11-20

## 2024-11-20 PROBLEM — Z34.93 PREGNANT AND NOT YET DELIVERED IN THIRD TRIMESTER: Status: RESOLVED | Noted: 2017-07-03 | Resolved: 2024-11-20

## 2024-11-20 LAB
HCG, PREGNANCY, URINE, POC: NEGATIVE
VALID INTERNAL CONTROL, POC: YES

## 2024-11-20 PROCEDURE — 90471 IMMUNIZATION ADMIN: CPT | Performed by: STUDENT IN AN ORGANIZED HEALTH CARE EDUCATION/TRAINING PROGRAM

## 2024-11-20 PROCEDURE — 90661 CCIIV3 VAC ABX FR 0.5 ML IM: CPT | Performed by: STUDENT IN AN ORGANIZED HEALTH CARE EDUCATION/TRAINING PROGRAM

## 2024-11-20 PROCEDURE — 99385 PREV VISIT NEW AGE 18-39: CPT | Performed by: STUDENT IN AN ORGANIZED HEALTH CARE EDUCATION/TRAINING PROGRAM

## 2024-11-20 PROCEDURE — 81025 URINE PREGNANCY TEST: CPT | Performed by: STUDENT IN AN ORGANIZED HEALTH CARE EDUCATION/TRAINING PROGRAM

## 2024-11-20 RX ORDER — NORELGESTROMIN AND ETHINYL ESTRADIOL 35; 150 UG/MG; UG/MG
1 PATCH TRANSDERMAL WEEKLY
Qty: 24 PATCH | Refills: 1 | Status: SHIPPED | OUTPATIENT
Start: 2024-11-20

## 2024-11-20 SDOH — ECONOMIC STABILITY: FOOD INSECURITY: WITHIN THE PAST 12 MONTHS, YOU WORRIED THAT YOUR FOOD WOULD RUN OUT BEFORE YOU GOT MONEY TO BUY MORE.: NEVER TRUE

## 2024-11-20 SDOH — ECONOMIC STABILITY: FOOD INSECURITY: WITHIN THE PAST 12 MONTHS, THE FOOD YOU BOUGHT JUST DIDN'T LAST AND YOU DIDN'T HAVE MONEY TO GET MORE.: NEVER TRUE

## 2024-11-20 SDOH — ECONOMIC STABILITY: INCOME INSECURITY: HOW HARD IS IT FOR YOU TO PAY FOR THE VERY BASICS LIKE FOOD, HOUSING, MEDICAL CARE, AND HEATING?: SOMEWHAT HARD

## 2024-11-20 ASSESSMENT — ENCOUNTER SYMPTOMS
SHORTNESS OF BREATH: 0
CHEST TIGHTNESS: 0
SINUS PAIN: 0

## 2024-11-20 ASSESSMENT — PATIENT HEALTH QUESTIONNAIRE - PHQ9
SUM OF ALL RESPONSES TO PHQ QUESTIONS 1-9: 0
SUM OF ALL RESPONSES TO PHQ QUESTIONS 1-9: 0
SUM OF ALL RESPONSES TO PHQ9 QUESTIONS 1 & 2: 0
1. LITTLE INTEREST OR PLEASURE IN DOING THINGS: NOT AT ALL
SUM OF ALL RESPONSES TO PHQ QUESTIONS 1-9: 0
SUM OF ALL RESPONSES TO PHQ QUESTIONS 1-9: 0
2. FEELING DOWN, DEPRESSED OR HOPELESS: NOT AT ALL
DEPRESSION UNABLE TO ASSESS: FUNCTIONAL CAPACITY MOTIVATION LIMITS ACCURACY

## 2024-11-20 NOTE — PROGRESS NOTES
Carin Borja is a 24 y.o. female (: 2000) presenting to address:    Chief Complaint   Patient presents with    New Patient       Vitals:    24 1430   BP: 118/84   Pulse: 81   Resp: 16   Temp: 98 °F (36.7 °C)   SpO2: 100%       \"Have you been to the ER, urgent care clinic since your last visit?  Hospitalized since your last visit?\"    NO    “Have you seen or consulted any other health care providers outside of [facility] since your last visit?”    NO        “Have you had a pap smear?”    NO coming up in 2025    No cervical cancer screening on file         
Immunizations Administered       Name Date Dose Route    Influenza, FLUCELVAX, (age 6 mo+) IM, Trivalent PF, 0.5mL 11/20/2024 0.5 mL Intramuscular    Site: Deltoid- Right    Lot: [id number]    NDC: 90481-591-43           
daily (before meals), Disp: 60 tablet, Rfl: 0    sucralfate (CARAFATE) 1 GM/10ML suspension, Take 10 mLs by mouth 4 times daily for 5 days, Disp: 200 mL, Rfl: 0      Review of Systems   Constitutional:  Negative for activity change.   HENT:  Negative for congestion and sinus pain.    Respiratory:  Negative for chest tightness and shortness of breath.    Cardiovascular:  Negative for chest pain.   Neurological:  Negative for dizziness and seizures.   Psychiatric/Behavioral:  Negative for agitation and behavioral problems.        Physical Exam  Constitutional:       Appearance: Normal appearance.   HENT:      Head: Normocephalic and atraumatic.   Eyes:      Extraocular Movements: Extraocular movements intact.      Pupils: Pupils are equal, round, and reactive to light.   Cardiovascular:      Rate and Rhythm: Normal rate and regular rhythm.      Heart sounds: Normal heart sounds. No murmur heard.  Pulmonary:      Effort: Pulmonary effort is normal. No respiratory distress.      Breath sounds: No wheezing.   Musculoskeletal:         General: No swelling.   Skin:     General: Skin is warm and dry.   Neurological:      General: No focal deficit present.      Mental Status: She is alert and oriented to person, place, and time.   Psychiatric:         Mood and Affect: Mood normal.         Behavior: Behavior normal.          ASSESSMENT and PLAN    Carin was seen today for new patient.    Diagnoses and all orders for this visit:    Establishing care with new doctor, encounter for  Comments:  flu shot, labs, fu in 1 year for physical  Orders:  -     CBC; Future  -     Basic Metabolic Panel; Future  -     Hemoglobin A1C; Future  -     Lipid Panel; Future    Right hip pain  Comments:  possibly sciatica, will refer to ortho because pain persistent after PT  Orders:  -     External Referral To Orthopedic Surgery    Encounter for initial prescription of transdermal patch hormonal contraceptive device  -     AMB POC URINE PREGNANCY

## 2024-11-20 NOTE — PATIENT INSTRUCTIONS
prescribed dosages   Website: [email or web address]/[email or web address]      The Co-Pay Relief Program  What they offer: The Co-Pay Relief Program provides you with direct prescription co-payment assistance, if you are an insured [place] citizen and financially and medically qualify, including Medicare Part D beneficiaries who require assistance with their prescription drug co-payments.   Phone toll-free: 1-966.329.1366  Website: www.YouFetch.SuccessNexus.com    The Partnership for Prescription Assistance   What they offer:  The Partnership for Prescription Assistance can help you if you lack  Prescription coverage to get the medicines you need through the public or private program that is right for you.  Phone toll-free: 1-968.964.3849  Website:  www.[employer].org      [place] Drug Card Program  What they offer:   The [place] Drug Card Program gives you and your family access to a free         Prescription Drug Card program and savings of up to 75% at more than 50,000 national and        Regional pharmacies.  Phone toll-free: 1-703.209.3896  Website: www.Reimagecard.PaperG

## 2025-03-21 ENCOUNTER — OFFICE VISIT (OUTPATIENT)
Age: 25
End: 2025-03-21

## 2025-03-21 VITALS — BODY MASS INDEX: 41.65 KG/M2 | HEIGHT: 65 IN | WEIGHT: 250 LBS

## 2025-03-21 DIAGNOSIS — M25.561 RIGHT KNEE PAIN, UNSPECIFIED CHRONICITY: Primary | ICD-10-CM

## 2025-03-21 DIAGNOSIS — G89.29 CHRONIC BILATERAL LOW BACK PAIN WITH BILATERAL SCIATICA: ICD-10-CM

## 2025-03-21 DIAGNOSIS — M25.552 LEFT HIP PAIN: ICD-10-CM

## 2025-03-21 DIAGNOSIS — M54.42 CHRONIC BILATERAL LOW BACK PAIN WITH BILATERAL SCIATICA: ICD-10-CM

## 2025-03-21 DIAGNOSIS — M54.41 CHRONIC BILATERAL LOW BACK PAIN WITH BILATERAL SCIATICA: ICD-10-CM

## 2025-03-21 RX ORDER — ACETAMINOPHEN 500 MG
1000 TABLET ORAL EVERY 8 HOURS
Qty: 180 TABLET | Refills: 0 | Status: SHIPPED | OUTPATIENT
Start: 2025-03-21 | End: 2025-04-20

## 2025-03-21 RX ORDER — NORGESTIMATE AND ETHINYL ESTRADIOL 0.25-0.035
1 KIT ORAL DAILY
COMMUNITY
Start: 2025-03-06

## 2025-03-21 RX ORDER — DICLOFENAC SODIUM 75 MG/1
75 TABLET, DELAYED RELEASE ORAL 2 TIMES DAILY
Qty: 60 TABLET | Refills: 1 | Status: SHIPPED | OUTPATIENT
Start: 2025-03-21 | End: 2025-05-20

## 2025-03-21 NOTE — PROGRESS NOTES
(11/20/2024)    Housing Stability Vital Sign     Unable to Pay for Housing in the Last Year: Not on file     Number of Times Moved in the Last Year: Not on file     Homeless in the Last Year: No         3/23/25       Prescription medication management discussed with patient.     Electronically signed by: Álvaro Baxter DO    Note: This note was completed using voice recognition software.  Any typographical/name errors or mistakes are unintentional.     The patient (or guardian, if applicable) and other individuals in attendance with the patient were advised that Artificial Intelligence will be utilized during this visit to record, process the conversation to generate a clinical note, and support improvement of the AI technology. The patient (or guardian, if applicable) and other individuals in attendance at the appointment consented to the use of AI, including the recording.

## 2025-04-01 ENCOUNTER — HOSPITAL ENCOUNTER (OUTPATIENT)
Facility: HOSPITAL | Age: 25
Setting detail: RECURRING SERIES
Discharge: HOME | End: 2025-04-04
Attending: ORTHOPAEDIC SURGERY
Payer: MEDICAID

## 2025-04-01 PROCEDURE — 97530 THERAPEUTIC ACTIVITIES: CPT

## 2025-04-01 PROCEDURE — 97161 PT EVAL LOW COMPLEX 20 MIN: CPT

## 2025-04-01 PROCEDURE — 97535 SELF CARE MNGMENT TRAINING: CPT

## 2025-04-01 NOTE — THERAPY EVALUATION
EDUARDO SAINI [facility]  [address], [address], [place] [address] Ph:[phone] Fx: [phone]  Plan of Care / Statement of Necessity for Physical Therapy Services     Patient Name: Carin Borja : 2000   Medical   Diagnosis: Right knee pain, unspecified chronicity  Left hip pain  Chronic bilateral low back pain with bilateral sciatica  Treatment Diagnosis:  M25.552  LEFT HIP PAIN and M25.561  RIGHT KNEE PAIN  and M54.59  OTHER LOWER BACK PAIN    Onset Date: .      Referral Source: Álvaro Baxter DO Start of Care (SOC): 2025   Prior Hospitalization: See medical history Provider #: [id number]   Prior Level of Function: Ind and pain free with ADLs and activities of leisure; taking 6 year old to activities   Comorbidities: Musculoskeletal disorders, Neurologic condition, and Psychological disorders Anxiety. MDD. Migraine. Chronic Pain. Umbilical Hernia.      Assessment / key information:    Patient is a 25 y.o. year old female. Primary complaints of Right knee pain, unspecified chronicity  Left hip pain  Chronic bilateral low back pain with bilateral sciatica that began on 10+ years (knee); 5+ years (back) due to insidious onset for knee pain and back pain due to a fall. Locates pain at medial and lateral right knee cap; B sciatic-type symptoms L>R with LE giving way. Rates pain as 0-9/10 from best to worst. Pain today is rated as 2/10. Describes pain as extreme straightening and hyperextension \"slipping\" in the right knee and her back feels like tightness and then L sided sharp pain in buttock and shooting pain down the leg (can also happen similarly on the R) and intermittent in nature. PT in the past, MR, ibuprofen (minimal help) makes the pain better. Bending the right knee; twisting of the knee; returning to upright from crouching/kneeling/squatting makes the pain worse. Patient is employed as a caterer through [employer],

## 2025-04-01 NOTE — PROGRESS NOTES
PHYSICAL / OCCUPATIONAL THERAPY - DAILY TREATMENT NOTE (updated )  For Eval visit    Patient Name: Carin Lawson Jonh    Date: 2025    : 2000  Insurance: Payor: [facility] PL / Plan: [employer]HC [place]ID [place] / Product Type: *No Product type* /      Patient  verified yes     Visit #   Current / Total 1 16   Time   In / Out 300 pm 340 pm   Pain   In / Out 2 2   Subjective Functional Status/Changes: See POC     TREATMENT AREA =  Right knee pain, unspecified chronicity [M25.561]  Left hip pain [M25.552]  Chronic bilateral low back pain with bilateral sciatica [M54.42, M54.41, G89.29]    OBJECTIVE    20 min   Eval - untimed                      Therapeutic Procedures:  Tx Min Billable or 1:1 Min (if diff from Tx Min) Procedure, Rationale, Specifics   10  71609 Therapeutic Activity (timed):  use of dynamic activities replicating functional movements to increase ROM, strength, coordination, balance, and proprioception in order to improve patient's ability to progress to PLOF and address remaining functional goals.  (see flow sheet as applicable)     Details if applicable:     10  34852 Self Care/Home Management (timed):  improve patient knowledge and understanding of home injury/symptom/pain management, positioning, posture/ergonomics, home safety, activity modification, transfer techniques, and joint protection strategies  to improve patient's ability to progress to PLOF and address remaining functional goals.  (see flow sheet as applicable)     Details if applicable:     20  [facility] Totals Reminder: bill using total billable min of TIMED therapeutic procedures (example: do not include dry needle or estim unattended, both untimed codes, in totals to left)  8-22 min = 1 unit; 23-37 min = 2 units; 38-52 min = 3 units; 53-67 min = 4 units; 68-82 min = 5 units   Total Total     [x]  Patient Education billed concurrently with other procedures   [x] Review HEP    []

## 2025-04-07 ENCOUNTER — TELEPHONE (OUTPATIENT)
Facility: HOSPITAL | Age: 25
End: 2025-04-07

## 2025-04-07 NOTE — TELEPHONE ENCOUNTER
Jil arrived to the clinic late and was advised she needed to r/s. She was not able to r/s and will keep her next appt.

## 2025-04-14 ENCOUNTER — HOSPITAL ENCOUNTER (OUTPATIENT)
Facility: HOSPITAL | Age: 25
Setting detail: RECURRING SERIES
Discharge: HOME | End: 2025-04-17
Attending: ORTHOPAEDIC SURGERY
Payer: MEDICAID

## 2025-04-14 PROCEDURE — 97530 THERAPEUTIC ACTIVITIES: CPT

## 2025-04-14 PROCEDURE — 97112 NEUROMUSCULAR REEDUCATION: CPT

## 2025-04-14 PROCEDURE — 97110 THERAPEUTIC EXERCISES: CPT

## 2025-04-14 NOTE — PROGRESS NOTES
5/7/2025  9:00 AM Laura Emerson PTA [facility]   6/20/2025  8:00 AM Álvaro Baxter DO VS BS AMB   11/24/2025  3:45 PM Carlene Shaw DO BSMA [facility] DEP

## 2025-04-21 ENCOUNTER — HOSPITAL ENCOUNTER (OUTPATIENT)
Facility: HOSPITAL | Age: 25
Setting detail: RECURRING SERIES
Discharge: HOME | End: 2025-04-24
Attending: ORTHOPAEDIC SURGERY
Payer: MEDICAID

## 2025-04-21 PROCEDURE — G0283 ELEC STIM OTHER THAN WOUND: HCPCS

## 2025-04-21 PROCEDURE — 97110 THERAPEUTIC EXERCISES: CPT

## 2025-04-21 PROCEDURE — 97530 THERAPEUTIC ACTIVITIES: CPT

## 2025-04-21 PROCEDURE — 97112 NEUROMUSCULAR REEDUCATION: CPT

## 2025-04-21 NOTE — PROGRESS NOTES
PHYSICAL / OCCUPATIONAL THERAPY - DAILY TREATMENT NOTE (updated )    Patient Name: Carin Lawson Jonh    Date: 2025    : 2000  Insurance: [facility]: [facility] / Plan: [employer]HC MEDICAID [place] / Product Type: *No Product type* /      Patient  verified Yes     Visit #   Current / Total 3 16   Time   In / Out 9:00 9:48   Pain   In / Out 3/10 3/10   Subjective Functional Status/Changes: Says she has more pain in the L hip today. Knee is feeling pretty good; says will probably hurt when she goes back down the stairs. Says nothing worse after last session in the hip and back.      TREATMENT AREA =  Right knee pain, unspecified chronicity  Left hip pain  Chronic bilateral low back pain with bilateral sciatica  Other low back pain  Pain in right knee  Pain in left hip    OBJECTIVE  Modalities Rationale:     decrease inflammation and decrease pain to improve patient's ability to progress to PLOF and address remaining functional goals.  10 min [x] Estim Unattended, type/location: Pre mod to lumbar and L hip/piriformis                                    [x]  w/ice    []  w/heat    min [] Estim Attended, type/location:                                     []  w/US     []  w/ice    []  w/heat    []  TENS insruct      min []  Mechanical Traction: type/lbs                   []  pro   []  sup   []  int   []  cont    []  before manual    []  after manual    min []  Ultrasound, settings/location:      min  unbill []  Ice     []  Heat    location/position:     min []  Paraffin,  details:     min []  Vasopneumatic Device, press/temp:     min []  Whirlpool / Fluido:    If using vaso (only need to measure limb vaso being performed on)      pre-treatment girth :       post-treatment girth :       measured at (landmark location) :      min []  Other:    Skin assessment post-treatment:   Intact     Therapeutic Procedures:  Tx Min Billable or 1:1 Min (if diff from Tx Min) Procedure,

## 2025-04-22 NOTE — PROGRESS NOTES
PHYSICAL / OCCUPATIONAL THERAPY - DAILY TREATMENT NOTE    Patient Name: Carin Lawson Jonh    Date: 2025    :   Insurance: Payor: [facility] PL / Plan: [employer]HC MEDICAID [place] / Product Type: *No Product type* /      Patient  verified Yes     Visit #   Current / Total 4 16   Time   In / Out 905  AM   Pain   In / Out 1/10 2/10  R LBP   Subjective Functional Status/Changes: Pt reports no significant pain this AM due to not doing anything before coming in. Patient denies falls or red flags since last visit.  ======================================  Next MD f/u appt: 25     TREATMENT AREA =  Right knee pain, unspecified chronicity [M25.561]  Left hip pain [M25.552]  Chronic bilateral low back pain with bilateral sciatica [M54.42, M54.41, G89.29]     OBJECTIVE    Modalities Rationale:     decrease inflammation and decrease pain to improve patient's ability to progress to PLOF and address remaining functional goals.    10 min [x] Estim Unattended, type/location: IFC LS and L hip/piri in supine with BLE on wedge post-session                                     [x]  w/ice    []  w/heat    min [] Estim Attended, type/location:                                     []  w/US     []  w/ice    []  w/heat    []  TENS insruct      min []  Mechanical Traction: type/lbs                   []  pro   []  sup   []  int   []  cont    []  before manual    []  after manual    min []  Ultrasound, settings/location:      min  unbill []  Ice     []  Heat    location/position:     min []  Vasopneumatic Device, press/temp:    If using vaso (only need to measure limb vaso being performed on)      pre-treatment girth :       post-treatment girth :       measured at (landmark location) :      min []  Other:    Skin assessment post-treatment:   Intact         Therapeutic Procedures:  Tx Min Billable or 1:1 Min (if diff from Tx Min) Procedure, Rationale, Specifics   Total  44 Total   MC

## 2025-04-23 ENCOUNTER — HOSPITAL ENCOUNTER (OUTPATIENT)
Facility: HOSPITAL | Age: 25
Setting detail: RECURRING SERIES
Discharge: HOME | End: 2025-04-26
Attending: ORTHOPAEDIC SURGERY
Payer: MEDICAID

## 2025-04-23 PROCEDURE — 97530 THERAPEUTIC ACTIVITIES: CPT

## 2025-04-23 PROCEDURE — 97112 NEUROMUSCULAR REEDUCATION: CPT

## 2025-04-23 PROCEDURE — 97535 SELF CARE MNGMENT TRAINING: CPT

## 2025-04-23 PROCEDURE — G0283 ELEC STIM OTHER THAN WOUND: HCPCS

## 2025-04-23 PROCEDURE — 97110 THERAPEUTIC EXERCISES: CPT

## 2025-04-23 NOTE — THERAPY RECERTIFICATION
[facility]  [address], [place] [address] - Ph: [phone]  Fx: [phone]  PHYSICAL THERAPY PROGRESS NOTE  Patient Name: Carin Borja : 2000   Treatment/Medical Diagnosis: Right knee pain, unspecified chronicity [M25.561]  Left hip pain [M25.552]  Chronic bilateral low back pain with bilateral sciatica [M54.42, M54.41, G89.29]   Referral Source: Álvaro Baxter DO     Date of Initial Visit: 04/10/25 Attended Visits: 5 Missed Visits: CX 0  NS 1     SUMMARY OF TREATMENT  Patient has been evaluated and assessed for Right knee pain, unspecified chronicity [M25.561]  Left hip pain [M25.552]  Chronic bilateral low back pain with bilateral sciatica [M54.42, M54.41, G89.29]. Pt has attended 4 follow up sessions since initial eval.   PT interventions have included therapeutic exercises, neuromuscular re-ed, therapeutic functional activity, manual techniques/mobilizations, and patient education on self-care strategies. HEP provided to maximize gains made from PT. E-stim with CP for pain control .    CURRENT STATUS  Pt has made steady progress with PT interventions thus far indicated by slight reduction in pain levels and improved functional scores.  Pt reports 30% overall improvement since beginning PT. Notices improvement with reduction in R knee pain and L hip pain with ADLs. Notable improvement with car ingress regarding the L hip. In the last 2 weeks, pain has ranged between 1-8/10. Daily pain ranges 3-7/10. \"Sore and achy then turns into \"sharp\" pain in the L hip (pointing to PSIS).    Would continue to benefit from skilled PT to continue to address functional limitations and progress towards PT goals. Thank you for this referral.    Current objective findings:  SAMPSON: 28% (EVAL: 34%)  0-20%: Minimal disability  20-40%: Moderate disability  40-60%: Severe disability  60-80%: Crippling effects  %: Completely

## 2025-04-28 ENCOUNTER — HOSPITAL ENCOUNTER (OUTPATIENT)
Facility: HOSPITAL | Age: 25
Setting detail: RECURRING SERIES
Discharge: HOME | End: 2025-05-01
Attending: ORTHOPAEDIC SURGERY
Payer: MEDICAID

## 2025-04-28 PROCEDURE — 97112 NEUROMUSCULAR REEDUCATION: CPT

## 2025-04-28 PROCEDURE — 97110 THERAPEUTIC EXERCISES: CPT

## 2025-04-28 PROCEDURE — 97535 SELF CARE MNGMENT TRAINING: CPT

## 2025-04-28 PROCEDURE — 97530 THERAPEUTIC ACTIVITIES: CPT

## 2025-04-28 NOTE — PROGRESS NOTES
PHYSICAL / OCCUPATIONAL THERAPY - DAILY TREATMENT NOTE    Patient Name: Carin Lawson Jonh    Date: 2025    :   Insurance: Payor: [facility] PL / Plan: [employer] MEDICAID [place] / Product Type: *No Product type* /      Patient  verified Yes     Visit #   Current / Total 5 16   Time   In / Out 859  AM   Pain   In / Out 3/10 3/10   Subjective Functional Status/Changes: Pt reports having a general 3/10 everywhere in the body. The knees hurt more than anything because she was helping her parents with home renovations (ie: putting up drywall, repetitive stair negotiation).  ======================================  Next MD f/u appt: 25     TREATMENT AREA =  Right knee pain, unspecified chronicity [M25.561]  Left hip pain [M25.552]  Chronic bilateral low back pain with bilateral sciatica [M54.42, M54.41, G89.29]     OBJECTIVE    Modalities Rationale:     decrease inflammation and decrease pain to improve patient's ability to progress to PLOF and address remaining functional goals.     min [x] Estim Unattended, type/location: IFC LS and L hip/piri in supine with BLE on wedge post-session                                     [x]  w/ice    []  w/heat    min [] Estim Attended, type/location:                                     []  w/US     []  w/ice    []  w/heat    []  TENS insruct      min []  Mechanical Traction: type/lbs                   []  pro   []  sup   []  int   []  cont    []  before manual    []  after manual    min []  Ultrasound, settings/location:     10 min  unbill [x]  Ice     []  Heat    location/position: To R knee in supine with (B)LE on wedge post-session    min []  Vasopneumatic Device, press/temp:    If using vaso (only need to measure limb vaso being performed on)      pre-treatment girth :       post-treatment girth :       measured at (landmark location) :      min []  Other:    Skin assessment post-treatment:   Intact         Therapeutic

## 2025-05-05 ENCOUNTER — HOSPITAL ENCOUNTER (OUTPATIENT)
Facility: HOSPITAL | Age: 25
Setting detail: RECURRING SERIES
Discharge: HOME | End: 2025-05-08
Attending: ORTHOPAEDIC SURGERY
Payer: MEDICAID

## 2025-05-05 PROCEDURE — 97530 THERAPEUTIC ACTIVITIES: CPT

## 2025-05-05 PROCEDURE — 97112 NEUROMUSCULAR REEDUCATION: CPT

## 2025-05-05 PROCEDURE — 97110 THERAPEUTIC EXERCISES: CPT

## 2025-05-05 NOTE — PROGRESS NOTES
PHYSICAL / OCCUPATIONAL THERAPY - DAILY TREATMENT NOTE    Patient Name: Carin Lawson Jonh    Date: 2025    :   Insurance: Payor: [facility] PL / Plan: [employer] MEDICAID [employer] [place] [place] / Product Type: *No Product type* /      Patient  verified Yes     Visit #   Current / Total 6 16   Time   In / Out 907  AM   Pain   In / Out 3/10 0/10   Subjective Functional Status/Changes: More pain in the R knee. Just running in and out a lot of her house over the weekend and her 50 lb puppy jumped on her.   Next MD f/u appt: 25     TREATMENT AREA =  Right knee pain, unspecified chronicity [M25.561]  Left hip pain [M25.552]  Chronic bilateral low back pain with bilateral sciatica [M54.42, M54.41, G89.29]     OBJECTIVE    Modalities Rationale:     decrease inflammation and decrease pain to improve patient's ability to progress to PLOF and address remaining functional goals.     min  unbill []  Ice     []  Heat    location/position:     min []  Other:    Skin assessment post-treatment:   Intact           Therapeutic Procedures:  Tx Min Billable or 1:1 Min (if diff from Tx Min) Procedure, Rationale, Specifics   Total  28 Total   [facility] Totals Reminder: bill using total billable min of TIMED therapeutic procedures (example: do not include dry needle or estim unattended, both untimed codes, in totals to left)  8-22 min = 1 unit; 23-37 min = 2 units; 38-52 min = 3 units; 53-67 min = 4 units; 68-82 min = 5 units     79068 Self Care/Home Management (timed):  improve patient knowledge and understanding of home injury/symptom/pain management, positioning, posture/ergonomics, home safety, activity modification, transfer techniques, and joint protection strategies  to improve patient's ability to progress to PLOF and address remaining functional goals.  (see flow sheet as applicable)    Details if applicable:  Reviewed & discussed:   [x]Current PT POC and goals (personal/functional

## 2025-05-06 NOTE — PROGRESS NOTES
Issued    04/14/25     Objective Information/Functional Measures/Assessment       Continued with LE/spinal ROM and strengthening interventions. ADDED TRX STS with foam to improve functional strength. Increase R>L knee pain during descent.  Concluded session with ice to R knee.        Patient will continue to benefit from skilled PT / OT services to modify and progress therapeutic interventions, analyze and address functional mobility deficits, analyze and address ROM deficits, analyze and address strength deficits, analyze and address soft tissue restrictions, analyze and cue for proper movement patterns, analyze and modify for postural abnormalities, and instruct in home and community integration to address functional deficits and attain remaining goals.        Progress toward goals / Updated goals:  []  See Progress Note/Recertification    NEXT PN DUE RECERT DATE   05/26/25 NA     # AUTH VISITS AUTH EXPIRATION DATE   Med nec 12/31/25       Short Term Goals: To be accomplished in 4 weeks  Goal/Measure of Progress - STGs Goal Met?   1.     Patient will reduce max pain to 5/10 in order to perform iADLs and ADLs more comfortably.   Status at last Eval: 8/10 (04/28/25) Current Status: 05/05/25 reports max pain 3/10 in the knee -   2.     Patient will improve glute strength to 4+/5 (75% bridge x 20 reps) for prolonged periods of sitting and standing.   Status at last Eval: Completed 20x reps at 50% (04/28/25) Current Status: 05/07/25 progressing; completed 2x12 glute bridges at 75%  -   3.     Patient will improve lumbar ROM to at least 75% to all planes for improved functional mobility.   Status at last Eval:    AROM  Left Right   Lumbar Flexion 75%   Extension 50%   SBing 75% 50%   Rotation 50% 50% (pop)    Current Status: AROM  Left Right   Lumbar Flexion 75%   Extension 50%   SBing 75% 75%   Rotation 75% 50% with shoulder  p!    progressing   4.     Patient will show compliance with regular HEP performance as

## 2025-05-07 ENCOUNTER — HOSPITAL ENCOUNTER (OUTPATIENT)
Facility: HOSPITAL | Age: 25
Setting detail: RECURRING SERIES
Discharge: HOME | End: 2025-05-10
Attending: ORTHOPAEDIC SURGERY
Payer: MEDICAID

## 2025-05-07 PROCEDURE — 97530 THERAPEUTIC ACTIVITIES: CPT

## 2025-05-07 PROCEDURE — 97112 NEUROMUSCULAR REEDUCATION: CPT

## 2025-05-07 PROCEDURE — 97110 THERAPEUTIC EXERCISES: CPT

## 2025-05-12 ENCOUNTER — HOSPITAL ENCOUNTER (OUTPATIENT)
Facility: HOSPITAL | Age: 25
Setting detail: RECURRING SERIES
Discharge: HOME | End: 2025-05-15
Attending: ORTHOPAEDIC SURGERY
Payer: MEDICAID

## 2025-05-12 PROCEDURE — 97110 THERAPEUTIC EXERCISES: CPT

## 2025-05-12 PROCEDURE — 97530 THERAPEUTIC ACTIVITIES: CPT

## 2025-05-12 PROCEDURE — 97112 NEUROMUSCULAR REEDUCATION: CPT

## 2025-05-12 NOTE — PROGRESS NOTES
PHYSICAL / OCCUPATIONAL THERAPY - DAILY TREATMENT NOTE    Patient Name: Carin Lawson Jonh    Date: 2025    :   Insurance: Payor: [facility] PL / Plan: [employer] [place]ID [place] / Product Type: *No Product type* /      Patient  verified Yes     Visit #   Current / Total 8 16   Time   In / Out 943 AM 1030 AM   Pain   In / Out 5/10 R knee  3/10 LBP, hip 2/10 R knee  0/10 LBP, hip    Subjective Functional Status/Changes: Pt reports having to baby walk downstairs after LV  also reports being taken out by her 57 lb pit puppy after last visit's appt.     Next MD f/u appt: 25     TREATMENT AREA =  Right knee pain, unspecified chronicity [M25.561]  Left hip pain [M25.552]  Chronic bilateral low back pain with bilateral sciatica [M54.42, M54.41, G89.29]     OBJECTIVE    Modalities Rationale:     decrease inflammation and decrease pain to improve patient's ability to progress to PLOF and address remaining functional goals.    10 min  unbill [x]  Ice     []  Heat    location/position: To R knee in semi recline post-session     min []  Other:    Skin assessment post-treatment:   Intact           Therapeutic Procedures:  Tx Min Billable or 1:1 Min (if diff from Tx Min) Procedure, Rationale, Specifics   Total  37 Total   [facility] Totals Reminder: bill using total billable min of TIMED therapeutic procedures (example: do not include dry needle or estim unattended, both untimed codes, in totals to left)  8-22 min = 1 unit; 23-37 min = 2 units; 38-52 min = 3 units; 53-67 min = 4 units; 68-82 min = 5 units     21034 Self Care/Home Management (timed):  improve patient knowledge and understanding of home injury/symptom/pain management, positioning, posture/ergonomics, home safety, activity modification, transfer techniques, and joint protection strategies  to improve patient's ability to progress to PLOF and address remaining functional goals.  (see flow sheet as

## 2025-05-13 ENCOUNTER — HOSPITAL ENCOUNTER (OUTPATIENT)
Facility: HOSPITAL | Age: 25
Setting detail: RECURRING SERIES
Discharge: HOME | End: 2025-05-16
Attending: ORTHOPAEDIC SURGERY
Payer: MEDICAID

## 2025-05-13 PROCEDURE — 97112 NEUROMUSCULAR REEDUCATION: CPT

## 2025-05-13 PROCEDURE — 97530 THERAPEUTIC ACTIVITIES: CPT

## 2025-05-13 PROCEDURE — 97110 THERAPEUTIC EXERCISES: CPT

## 2025-05-13 NOTE — PROGRESS NOTES
PHYSICAL / OCCUPATIONAL THERAPY - DAILY TREATMENT NOTE (updated )    Patient Name: Carin Lawson Jonh    Date: 2025    : 2000  Insurance: Payor: [facility] / Plan: [employer] [place] / Product Type: *No Product type* /      Patient  verified yes     Visit #   Current / Total 9 16   Time   In / Out 420 pm 500 pm   Pain   In / Out 3-4 4-5   Subjective Functional Status/Changes: Patient reports her back feels okay it is her hip and knee that continue to bother her. She notes one of her kids ran into her knee and she hit her hip on a table today.         TREATMENT AREA =  Right knee pain, unspecified chronicity [M25.561]  Left hip pain [M25.552]  Chronic bilateral low back pain with bilateral sciatica [M54.42, M54.41, G89.29]    OBJECTIVE    Therapeutic Procedures:  Tx Min Billable or 1:1 Min (if diff from Tx Min) Procedure, Rationale, Specifics   15  50901 Therapeutic Exercise (timed):  increase ROM, strength, coordination, balance, and proprioception to improve patient's ability to progress to PLOF and address remaining functional goals. (see flow sheet as applicable)     Details if applicable:       15  00141 Neuromuscular Re-Education (timed):  improve balance, coordination, kinesthetic sense, posture, core stability and proprioception to improve patient's ability to develop conscious control of individual muscles and awareness of position of extremities in order to progress to PLOF and address remaining functional goals. (see flow sheet as applicable)     Details if applicable:     10  01292 Therapeutic Activity (timed):  use of dynamic activities replicating functional movements to increase ROM, strength, coordination, balance, and proprioception in order to improve patient's ability to progress to PLOF and address remaining functional goals.  (see flow sheet as applicable)     Details if applicable:     40  [facility] Totals Reminder: bill using total

## 2025-05-19 ENCOUNTER — HOSPITAL ENCOUNTER (OUTPATIENT)
Facility: HOSPITAL | Age: 25
Setting detail: RECURRING SERIES
Discharge: HOME | End: 2025-05-22
Attending: ORTHOPAEDIC SURGERY
Payer: MEDICAID

## 2025-05-19 PROCEDURE — 97112 NEUROMUSCULAR REEDUCATION: CPT

## 2025-05-19 PROCEDURE — 97110 THERAPEUTIC EXERCISES: CPT

## 2025-05-19 PROCEDURE — 97530 THERAPEUTIC ACTIVITIES: CPT

## 2025-05-19 NOTE — PROGRESS NOTES
PHYSICAL / OCCUPATIONAL THERAPY - DAILY TREATMENT NOTE    Patient Name: Carin Lawson Jonh    Date: 2025    :   Insurance: Payor: [facility] / Plan: [employer] [place] / Product Type: *No Product type* /      Patient  verified Yes     Visit #   Current / Total 10 16   Time   In / Out 945 AM 1045 AM   Pain   In / Out 0/10 0-2/10   Subjective Functional Status/Changes: Pt reports having no pain because she just woke up. Did a lot of walking over the weekend so the L knee is also bothering her. Patient denies falls or red flags since last visit.  Next MD f/u appt: 25      TREATMENT AREA =  Right knee pain, unspecified chronicity [M25.561]  Left hip pain [M25.552]  Chronic bilateral low back pain with bilateral sciatica [M54.42, M54.41, G89.29]     OBJECTIVE    Modalities Rationale:     decrease inflammation and decrease pain to improve patient's ability to progress to PLOF and address remaining functional goals.    10 min  unbill [x]  Ice     []  Heat    location/position: To low back in semi recline post-session     min []  Other:    Skin assessment post-treatment:   Intact           Therapeutic Procedures:  Tx Min Billable or 1:1 Min (if diff from Tx Min) Procedure, Rationale, Specifics   Total  50 Total   [facility] Totals Reminder: bill using total billable min of TIMED therapeutic procedures (example: do not include dry needle or estim unattended, both untimed codes, in totals to left)  8-22 min = 1 unit; 23-37 min = 2 units; 38-52 min = 3 units; 53-67 min = 4 units; 68-82 min = 5 units     42142 Self Care/Home Management (timed):  improve patient knowledge and understanding of home injury/symptom/pain management, positioning, posture/ergonomics, home safety, activity modification, transfer techniques, and joint protection strategies  to improve patient's ability to progress to PLOF and address remaining functional goals.  (see flow sheet as

## 2025-05-20 ENCOUNTER — HOSPITAL ENCOUNTER (OUTPATIENT)
Facility: HOSPITAL | Age: 25
Setting detail: RECURRING SERIES
Discharge: HOME | End: 2025-05-23
Attending: ORTHOPAEDIC SURGERY
Payer: MEDICAID

## 2025-05-20 PROCEDURE — 97112 NEUROMUSCULAR REEDUCATION: CPT

## 2025-05-20 PROCEDURE — 97110 THERAPEUTIC EXERCISES: CPT

## 2025-05-20 PROCEDURE — 97530 THERAPEUTIC ACTIVITIES: CPT

## 2025-05-20 NOTE — PROGRESS NOTES
5/20/2025    6:52 AM    If an interpreting service was utilized for treatment of this patient, the contents of this document represent the material reviewed with the patient via the .     Future Appointments   Date Time Provider Department Center   5/20/2025  9:00 AM Jil Gramajo, PT [facility]   5/28/2025  9:40 AM Laura Emerson PTA [facility]   6/20/2025  8:00 AM Álvaro Baxter DO [facility]   11/24/2025  3:45 PM [facility] Shaw DO [facility]

## 2025-05-27 ENCOUNTER — APPOINTMENT (OUTPATIENT)
Facility: HOSPITAL | Age: 25
End: 2025-05-27
Attending: ORTHOPAEDIC SURGERY
Payer: MEDICAID

## 2025-05-27 ENCOUNTER — TELEPHONE (OUTPATIENT)
Facility: HOSPITAL | Age: 25
End: 2025-05-27

## 2025-05-28 ENCOUNTER — APPOINTMENT (OUTPATIENT)
Facility: HOSPITAL | Age: 25
End: 2025-05-28
Attending: ORTHOPAEDIC SURGERY
Payer: MEDICAID

## 2025-06-13 ENCOUNTER — TELEPHONE (OUTPATIENT)
Facility: HOSPITAL | Age: 25
End: 2025-06-13

## 2025-06-20 ENCOUNTER — OFFICE VISIT (OUTPATIENT)
Age: 25
End: 2025-06-20
Payer: MEDICAID

## 2025-06-20 VITALS — HEIGHT: 65 IN | BODY MASS INDEX: 41.75 KG/M2 | WEIGHT: 250.6 LBS

## 2025-06-20 DIAGNOSIS — M25.561 RIGHT KNEE PAIN, UNSPECIFIED CHRONICITY: ICD-10-CM

## 2025-06-20 DIAGNOSIS — G89.29 CHRONIC BILATERAL LOW BACK PAIN WITH BILATERAL SCIATICA: ICD-10-CM

## 2025-06-20 DIAGNOSIS — M25.552 LEFT HIP PAIN: Primary | ICD-10-CM

## 2025-06-20 DIAGNOSIS — M54.42 CHRONIC BILATERAL LOW BACK PAIN WITH BILATERAL SCIATICA: ICD-10-CM

## 2025-06-20 DIAGNOSIS — M54.41 CHRONIC BILATERAL LOW BACK PAIN WITH BILATERAL SCIATICA: ICD-10-CM

## 2025-06-20 PROCEDURE — 99214 OFFICE O/P EST MOD 30 MIN: CPT | Performed by: ORTHOPAEDIC SURGERY

## 2025-06-20 RX ORDER — MELOXICAM 15 MG/1
TABLET ORAL
Qty: 30 TABLET | Refills: 1 | Status: SHIPPED | OUTPATIENT
Start: 2025-06-20 | End: 2025-06-20

## 2025-06-20 RX ORDER — DICLOFENAC SODIUM 75 MG/1
75 TABLET, DELAYED RELEASE ORAL 2 TIMES DAILY
Qty: 60 TABLET | Refills: 3 | Status: SHIPPED | OUTPATIENT
Start: 2025-06-20 | End: 2025-10-18

## 2025-06-20 NOTE — PROGRESS NOTES
Patient: Carin Borja                MRN: [id number]       SSN: [id number]  YOB: 2000        AGE: 25 y.o.        SEX: female  BMI: Body mass index is 41.7 kg/m².    PCP: Carlene Shaw DO  06/20/25    Chief Complaint: Follow-up (3 month check )      1. Left hip pain  2. Chronic bilateral low back pain with bilateral sciatica  -     diclofenac (VOLTAREN) 75 MG EC tablet; Take 1 tablet by mouth 2 times daily, Disp-60 tablet, R-3Normal  3. Right knee pain, unspecified chronicity  -     MRI KNEE RIGHT WO CONTRAST; Future        SUBJECTIVE:    PROCEDURE HISTORY:  none    History of Present Illness  The patient is a 25-year-old female who presents for evaluation of right knee and left hip pain.    She has been experiencing intermittent right knee pain since her high school years, with no recollection of any specific injury that could have precipitated the condition. The current episode of pain is more severe than previous ones, primarily localized to the medial aspect of the knee, accompanied by significant swelling. She also reports back pain and occasional radiating pain extending to her toes bilaterally. She has not undergone an MRI of her hip. Her sleep is frequently disrupted due to the need to change positions because of the pain. She has attempted to manage the pain with ibuprofen and Flexeril, but these interventions have proven ineffective. Typically, the pain is managed with a brace worn for a duration of 1 to 2 months, after which the symptoms subside.    She experiences sharp, shooting pain in her left hip that radiates down her leg, occasionally causing her leg to give way. This symptom is predominantly on the left side, although it does sporadically affect the right side as well. She underwent a 6-week course of physical therapy for her hips a few years ago, during which she was informed of a rotational issue with her hips. An MRI was performed at that time, but she did

## 2025-07-18 ENCOUNTER — HOSPITAL ENCOUNTER (OUTPATIENT)
Facility: HOSPITAL | Age: 25
Discharge: HOME | End: 2025-07-21
Attending: ORTHOPAEDIC SURGERY
Payer: MEDICAID

## 2025-07-18 DIAGNOSIS — M25.561 RIGHT KNEE PAIN, UNSPECIFIED CHRONICITY: ICD-10-CM

## 2025-07-18 PROCEDURE — 73721 MRI JNT OF LWR EXTRE W/O DYE: CPT

## 2025-08-11 ENCOUNTER — OFFICE VISIT (OUTPATIENT)
Age: 25
End: 2025-08-11
Payer: MEDICAID

## 2025-08-11 DIAGNOSIS — M54.42 CHRONIC BILATERAL LOW BACK PAIN WITH BILATERAL SCIATICA: ICD-10-CM

## 2025-08-11 DIAGNOSIS — G89.29 CHRONIC BILATERAL LOW BACK PAIN WITH BILATERAL SCIATICA: ICD-10-CM

## 2025-08-11 DIAGNOSIS — M54.41 CHRONIC BILATERAL LOW BACK PAIN WITH BILATERAL SCIATICA: ICD-10-CM

## 2025-08-11 DIAGNOSIS — M25.561 RIGHT KNEE PAIN, UNSPECIFIED CHRONICITY: Primary | ICD-10-CM

## 2025-08-11 PROCEDURE — 99214 OFFICE O/P EST MOD 30 MIN: CPT | Performed by: ORTHOPAEDIC SURGERY
